# Patient Record
Sex: FEMALE | Race: BLACK OR AFRICAN AMERICAN | NOT HISPANIC OR LATINO | Employment: PART TIME | URBAN - METROPOLITAN AREA
[De-identification: names, ages, dates, MRNs, and addresses within clinical notes are randomized per-mention and may not be internally consistent; named-entity substitution may affect disease eponyms.]

---

## 2017-03-08 LAB
HBV SURFACE AG SERPL QL IA: NEGATIVE
HIV 1+2 AB+HIV1 P24 AG SERPL QL IA: NEGATIVE
RUBELLA ANTIBODY IGG QUANTITATIVE: NORMAL IU/ML
T PALLIDUM IGG SER QL: NONREACTIVE

## 2017-09-28 LAB — GROUP B STREP PCR: NEGATIVE

## 2017-10-23 ENCOUNTER — HOSPITAL ENCOUNTER (INPATIENT)
Facility: CLINIC | Age: 37
LOS: 3 days | Discharge: HOME HEALTH | End: 2017-10-26
Attending: OBSTETRICS & GYNECOLOGY | Admitting: OBSTETRICS & GYNECOLOGY
Payer: COMMERCIAL

## 2017-10-23 PROBLEM — O24.414 INSULIN CONTROLLED GESTATIONAL DIABETES MELLITUS (GDM) IN THIRD TRIMESTER: Status: ACTIVE | Noted: 2017-10-23

## 2017-10-23 LAB
ABO + RH BLD: NORMAL
ABO + RH BLD: NORMAL
BASOPHILS # BLD AUTO: 0 10E9/L (ref 0–0.2)
BASOPHILS NFR BLD AUTO: 0.1 %
BLD GP AB SCN SERPL QL: NORMAL
BLOOD BANK CMNT PATIENT-IMP: NORMAL
DIFFERENTIAL METHOD BLD: ABNORMAL
EOSINOPHIL # BLD AUTO: 0.1 10E9/L (ref 0–0.7)
EOSINOPHIL NFR BLD AUTO: 0.7 %
ERYTHROCYTE [DISTWIDTH] IN BLOOD BY AUTOMATED COUNT: 13.2 % (ref 10–15)
GLUCOSE BLDC GLUCOMTR-MCNC: 129 MG/DL (ref 70–99)
GLUCOSE SERPL-MCNC: 96 MG/DL (ref 70–99)
HCT VFR BLD AUTO: 36.5 % (ref 35–47)
HGB BLD-MCNC: 12.9 G/DL (ref 11.7–15.7)
IMM GRANULOCYTES # BLD: 0 10E9/L (ref 0–0.4)
IMM GRANULOCYTES NFR BLD: 0.4 %
LYMPHOCYTES # BLD AUTO: 1.8 10E9/L (ref 0.8–5.3)
LYMPHOCYTES NFR BLD AUTO: 21.3 %
MCH RBC QN AUTO: 29.6 PG (ref 26.5–33)
MCHC RBC AUTO-ENTMCNC: 35.3 G/DL (ref 31.5–36.5)
MCV RBC AUTO: 84 FL (ref 78–100)
MONOCYTES # BLD AUTO: 0.5 10E9/L (ref 0–1.3)
MONOCYTES NFR BLD AUTO: 5.7 %
NEUTROPHILS # BLD AUTO: 5.9 10E9/L (ref 1.6–8.3)
NEUTROPHILS NFR BLD AUTO: 71.8 %
NRBC # BLD AUTO: 0 10*3/UL
NRBC BLD AUTO-RTO: 0 /100
PLATELET # BLD AUTO: 138 10E9/L (ref 150–450)
RBC # BLD AUTO: 4.36 10E12/L (ref 3.8–5.2)
SPECIMEN EXP DATE BLD: NORMAL
WBC # BLD AUTO: 8.3 10E9/L (ref 4–11)

## 2017-10-23 PROCEDURE — 25000131 ZZH RX MED GY IP 250 OP 636 PS 637: Performed by: OBSTETRICS & GYNECOLOGY

## 2017-10-23 PROCEDURE — 00000146 ZZHCL STATISTIC GLUCOSE BY METER IP

## 2017-10-23 PROCEDURE — 85025 COMPLETE CBC W/AUTO DIFF WBC: CPT | Performed by: OBSTETRICS & GYNECOLOGY

## 2017-10-23 PROCEDURE — 86780 TREPONEMA PALLIDUM: CPT | Performed by: OBSTETRICS & GYNECOLOGY

## 2017-10-23 PROCEDURE — 86901 BLOOD TYPING SEROLOGIC RH(D): CPT | Performed by: OBSTETRICS & GYNECOLOGY

## 2017-10-23 PROCEDURE — 12000029 ZZH R&B OB INTERMEDIATE

## 2017-10-23 PROCEDURE — 82947 ASSAY GLUCOSE BLOOD QUANT: CPT | Performed by: OBSTETRICS & GYNECOLOGY

## 2017-10-23 PROCEDURE — 86900 BLOOD TYPING SEROLOGIC ABO: CPT | Performed by: OBSTETRICS & GYNECOLOGY

## 2017-10-23 PROCEDURE — 25000132 ZZH RX MED GY IP 250 OP 250 PS 637: Performed by: OBSTETRICS & GYNECOLOGY

## 2017-10-23 PROCEDURE — 86850 RBC ANTIBODY SCREEN: CPT | Performed by: OBSTETRICS & GYNECOLOGY

## 2017-10-23 PROCEDURE — 36415 COLL VENOUS BLD VENIPUNCTURE: CPT | Performed by: OBSTETRICS & GYNECOLOGY

## 2017-10-23 RX ORDER — DEXTROSE MONOHYDRATE 25 G/50ML
25-50 INJECTION, SOLUTION INTRAVENOUS
Status: DISCONTINUED | OUTPATIENT
Start: 2017-10-23 | End: 2017-10-24

## 2017-10-23 RX ORDER — NICOTINE POLACRILEX 4 MG
15-30 LOZENGE BUCCAL
Status: DISCONTINUED | OUTPATIENT
Start: 2017-10-23 | End: 2017-10-24

## 2017-10-23 RX ORDER — ZOLPIDEM TARTRATE 5 MG/1
5 TABLET ORAL
Status: DISCONTINUED | OUTPATIENT
Start: 2017-10-23 | End: 2017-10-26 | Stop reason: HOSPADM

## 2017-10-23 RX ORDER — TERBUTALINE SULFATE 1 MG/ML
0.25 INJECTION, SOLUTION SUBCUTANEOUS
Status: DISCONTINUED | OUTPATIENT
Start: 2017-10-23 | End: 2017-10-24

## 2017-10-23 RX ADMIN — DINOPROSTONE 10 MG: 10 INSERT VAGINAL at 20:46

## 2017-10-23 RX ADMIN — INSULIN HUMAN 9 UNITS: 100 INJECTION, SUSPENSION SUBCUTANEOUS at 22:00

## 2017-10-23 RX ADMIN — ZOLPIDEM TARTRATE 5 MG: 5 TABLET, FILM COATED ORAL at 23:22

## 2017-10-23 NOTE — IP AVS SNAPSHOT
MRN:[id number]                      After Visit Summary   10/23/2017    Jo Ann Mckeon    MRN: [id number]           Thank you!     Thank you for choosing [facility] for your care. Our goal is always to provide you with excellent care. Hearing back from our patients is one way we can continue to improve our services. Please take a few minutes to complete the written survey that you may receive in the mail after you visit with us. Thank you!        Patient Information     Date Of Birth          1980        About your hospital stay     You were admitted on:  October 23, 2017 You last received care in the:  [address]    You were discharged on:  October 26, 2017        Reason for your hospital stay       Maternity care                  Who to Call     For medical emergencies, please call 911.  For non-urgent questions about your medical care, please call your primary care provider or clinic, [phone]          Attending Provider     Provider Specialty    Tequila Mendoza MD OB/Gyn       Primary Care Provider Office Phone # Fax #    [facility] [phone]      After Care Instructions     Activity       Review discharge instructions            Diet       Resume previous diet            Discharge Instructions - Gestational diabetic patients       Gestational diabetic patients to follow up for fasting blood sugar and 2 hour 75gm glucose load at 6 weeks postpartum.                  Further instructions from your care team       Postpartum Vaginal Delivery Instructions    Activity       Ask family and friends for help when you need it.    Do not place anything in your vagina for 6 weeks.    You are not restricted on other activities, but take it easy for a few weeks to allow your body to recover from delivery.  You are able to do any activities you feel up to that point.    No driving until you have stopped taking your pain medications (usually  two weeks after delivery).     Call your health care provider if you have any of these symptoms:       Increased pain, swelling, redness, or fluid around your stiches from an episiotomy or perineal tear.    A fever above 100.4 F (38 C) with or without chills when placing a thermometer under your tongue.    You soak a sanitary pad with blood within 1 hour, or you see blood clots larger than a golf ball.    Bleeding that lasts more than 6 weeks.    Vaginal discharge that smells bad.    Severe pain, cramping or tenderness in your lower belly area.    A need to urinate more frequently (use the toilet more often), more urgently (use the toilet very quickly), or it burns when you urinate.    Nausea and vomiting.    Redness, swelling or pain around a vein in your leg.    Problems breastfeeding or a red or painful area on your breast.    Chest pain and cough or are gasping for air.    Problems coping with sadness, anxiety, or depression.  If you have any concerns about hurting yourself or the baby, call your provider immediately.     You have questions or concerns after you return home.     Keep your hands clean:  Always wash your hands before touching your perineal area and stitches.  This helps reduce your risk of infection.  If your hands aren't dirty, you may use an alcohol hand-rub to clean your hands. Keep your nails clean and short.    You will need to make a postpartum appointment in 6 weeks at [facility].     Pending Results     No orders found from 10/21/2017 to 10/24/2017.            Statement of Approval     Ordered          10/26/17 0804  I have reviewed and agree with all the recommendations and orders detailed in this document.  EFFECTIVE NOW     Approved and electronically signed by:  Sania Valladares MD             Admission Information     Date & Time Provider Department Dept. Phone    10/23/2017 Tequila Mendoza MD [address] [phone]      Your Vitals Were     Blood  "Pressure Pulse Temperature Respirations Height Weight    103/62 60 97.4  F (36.3  C) (Oral) 16 1.676 m (5' 6\") 84.8 kg (187 lb)    Pulse Oximetry BMI (Body Mass Index)                100% 30.18 kg/m2          ECO Information     ECO lets you send messages to your doctor, view your test results, renew your prescriptions, schedule appointments and more. To sign up, go to [email or web address] . Click on \"Log in\" on the left side of the screen, which will take you to the Welcome page. Then click on \"Sign up Now\" on the right side of the page.     You will be asked to enter the access code listed below, as well as some personal information. Please follow the directions to create your username and password.     Your access code is: [email or web address]  Expires: 2018 10:35 AM     Your access code will  in 90 days. If you need help or a new code, please call your [facility] clinic or [phone].        Care EveryWhere ID     This is your Care EveryWhere ID. This could be used by other organizations to access your [facility] medical records  [id number]        Equal Access to Services     JAXON GUILLEN AH: Cristopher Rothman, wayanira mahoney, qaybta kaalmada adedarylyada, jorge a lock. So [facility] [phone].    ATENCIÓN: Si habla español, tiene a vines disposición servicios gratuitos de asistencia lingüística. Llame al [phone].    We comply with applicable federal civil rights laws and [place] laws. We do not discriminate on the basis of race, color, national origin, age, disability, sex, sexual orientation, or gender identity.               Review of your medicines      START taking        Dose / Directions    acetaminophen 325 MG tablet   Commonly known as:  TYLENOL        Dose:  650 mg   Take 2 tablets (650 mg) by mouth every 4 hours as needed for mild pain   Quantity:  100 tablet   Refills:  0       ibuprofen 400 MG tablet   Commonly known as:  ADVIL/MOTRIN        Dose:  " 400-800 mg   Take 1-2 tablets (400-800 mg) by mouth every 6 hours as needed for other (cramping)   Quantity:  120 tablet   Refills:  0         CONTINUE these medicines which have NOT CHANGED        Dose / Directions    FISH OIL PO        Dose:  1 tablet   Take 1 tablet by mouth daily.   Refills:  0       PRENATAL VITAMINS PO        Dose:  1 tablet   Take 1 tablet by mouth daily.   Refills:  0       VITAMIN D (CHOLECALCIFEROL) PO        Dose:  2000 Units   Take 2,000 Units by mouth daily   Refills:  0         STOP taking     HumuLIN N KWIKPEN 100 UNIT/ML injection   Generic drug:  insulin isophane human                Where to get your medicines      These medications were sent to [facility] [place], [place] - [address]  [address], [place] [address]     Phone:  [phone]     acetaminophen 325 MG tablet    ibuprofen 400 MG tablet                Protect others around you: Learn how to safely use, store and throw away your medicines at www.disposemymeds.org.             Medication List: This is a list of all your medications and when to take them. Check marks below indicate your daily home schedule. Keep this list as a reference.      Medications           Morning Afternoon Evening Bedtime As Needed    acetaminophen 325 MG tablet   Commonly known as:  TYLENOL   Take 2 tablets (650 mg) by mouth every 4 hours as needed for mild pain   Last time this was given:  650 mg on 10/26/2017  5:50 AM                                FISH OIL PO   Take 1 tablet by mouth daily.                                ibuprofen 400 MG tablet   Commonly known as:  ADVIL/MOTRIN   Take 1-2 tablets (400-800 mg) by mouth every 6 hours as needed for other (cramping)   Last time this was given:  800 mg on 10/26/2017  5:50 AM                                PRENATAL VITAMINS PO   Take 1 tablet by mouth daily.                                VITAMIN D (CHOLECALCIFEROL) PO   Take 2,000 Units by mouth daily                                           More Information        Breastfeeding: Caring for Yourself  When you have a new little person in your life, it s easy to forget about yourself. There are new demands on your time. There are also new responsibilities. But it s important to take care of yourself. This will help you take better care of your new baby.     Healthy habits  Here are some healthy tips:    Get exercise when you can. If you leak milk, it will help to nurse right before the activity.    Avoid smoking. Smoking is unhealthy for you and may cause you to make less milk. Secondhand smoke is also harmful to your baby.    Talk to your healthcare provider about alcohol, if you choose to drink.    When you re sick, tell your healthcare provider that you are breastfeeding. Few medicines and illnesses affect breastfeeding, but it is important to check.    Ask your healthcare provider before taking any prescription or over-the-counter medicines, herbs, or supplements.  Comfy clothes  Suggestions for being comfortable when breastfeeding include:    Find a comfortable nursing bra. Many women find underwire uncomfortable. Some stores offer on-site fittings. Ask your healthcare provider or nurse for a referral.    If you have leaking milk, place breast pads inside your bra.    Choose an extra-supportive bra for exercise. Or you can wear two bras at the same time for more support.    Wear loose tops that can be lifted for breastfeeding. You can also buy clothes specially made for breastfeeding moms.  A note about sex  After delivery, it may take a while before your interest in sex returns. Share your feelings with your partner. Your healthcare provider will let you know when it is safe to resume having sex. When you re ready, know that:    There are several forms of birth control that can be used while breastfeeding. Ask your healthcare provider what to use for pregnancy prevention while you are nursing.    Breastfeeding  hormones may cause vaginal dryness. Some women find using a water-based lubricant makes sex more comfortable.    Milk may let down when you are aroused. Applying pressure on the nipple, using breast pads, or a towel may help with this.  When to call your healthcare provider  Call your healthcare provider if:    You feel overwhelmed and don't know where to turn.    You feel very sad or don t want to be with your baby.    You feel like your baby cries all the time and won't be soothed    You are unable to exercise, or have sex, without discomfort.    You are unsure about a medicine, illness, or activity and its effect on breastfeeding.   Date Last Reviewed: 9/7/2015 2000-2017 [employer]. [address] [place] [address], [place] [address]. All rights reserved. This information is not intended as a substitute for professional medical care. Always follow your healthcare professional's instructions.                After Delivery: When to Call the Healthcare Provider  Health problems sometimes arise with you or your baby following delivery. Call your baby's healthcare provider or your healthcare provider if you see any of the signs below.      Watch your baby for these signs  Call your baby s healthcare provider if your baby:    Has a fever (see Fever and children, below)    Has fewer than 6 wet diapers a day (Hint: Disposable diapers may feel heavy or hard after being soaked.)    Skin or whites of the eyes appear yellow    Cries for a long time, or if it sounds as if the cries are caused by pain    Has diarrhea    Refuses 2 feedings in a row    Is inactive or listless    Is vomiting    Has blood in the stool or vomit    Has a rash    Has ear drainage    Has trouble breathing    Has a seizure    Will not wake up  Trust your instincts. If you are concerned about your baby, call your baby's healthcare provider.  Fever and children  Always use a digital thermometer to check your child s temperature. Never use a  mercury thermometer.  For infants and toddlers, be sure to use a rectal thermometer correctly. A rectal thermometer may accidentally poke a hole in (perforate) the rectum. It may also pass on germs from the stool. Always follow the product maker s directions for proper use. If you don t feel comfortable taking a rectal temperature, use another method. When you talk to your child s healthcare provider, tell him or her which method you used to take your child s temperature.  Here are guidelines for fever temperature. Ear temperatures aren t accurate before 6 months of age. Don t take an oral temperature until your child is at least 4 years old.  Infant under 3 months old:  Ask your child s healthcare provider how you should take the temperature.    Rectal or forehead (temporal artery) temperature of 100.4 F (38 C) or higher, or as directed by the provider   Watch your own health for these signs  Call your own healthcare provider if you have:    Burning or pain in your breasts    Red streaks or hard lumpy areas in your breasts    Problems with breastfeeding    A fever of 100.4 F (38 C) or higher, or as directed by your healthcare provider    Extreme tiredness or body aches, as if you have the flu    Feelings of extreme sadness or anxiety, or a feeling that you don t want to be with your baby    Abdominal pain that isn t relieved with medicine    Vaginal discharge that has a bad odor    Vaginal bleeding that soaks more than one pad per hour    If you had a  section, call for concerns about your incision site such as pain, drainage, or bleeding from your incision  Date Last Reviewed: 2016-2017 [employer]. [address] [place] [address], [place] [address]. All rights reserved. This information is not intended as a substitute for professional medical care. Always follow your healthcare professional's instructions.

## 2017-10-23 NOTE — IP AVS SNAPSHOT
[address]    [place] [address]    Phone:  [phone]                                       After Visit Summary   10/23/2017    Jo Ann Mckeon    MRN: [id number]           After Visit Summary Signature Page     I have received my discharge instructions, and my questions have been answered. I have discussed any challenges I see with this plan with the nurse or doctor.    ..........................................................................................................................................  Patient/Patient Representative Signature      ..........................................................................................................................................  Patient Representative Print Name and Relationship to Patient    ..................................................               ................................................  Date                                            Time    ..........................................................................................................................................  Reviewed by Signature/Title    ...................................................              ..............................................  Date                                                            Time

## 2017-10-24 ENCOUNTER — ANESTHESIA EVENT (OUTPATIENT)
Dept: OBGYN | Facility: CLINIC | Age: 37
End: 2017-10-24
Payer: COMMERCIAL

## 2017-10-24 ENCOUNTER — ANESTHESIA (OUTPATIENT)
Dept: OBGYN | Facility: CLINIC | Age: 37
End: 2017-10-24
Payer: COMMERCIAL

## 2017-10-24 PROBLEM — O24.419 GDM, CLASS A2: Status: ACTIVE | Noted: 2017-10-24

## 2017-10-24 LAB
GLUCOSE BLDC GLUCOMTR-MCNC: 107 MG/DL (ref 70–99)
GLUCOSE BLDC GLUCOMTR-MCNC: 136 MG/DL (ref 70–99)
GLUCOSE BLDC GLUCOMTR-MCNC: 171 MG/DL (ref 70–99)
GLUCOSE BLDC GLUCOMTR-MCNC: 66 MG/DL (ref 70–99)
GLUCOSE BLDC GLUCOMTR-MCNC: 75 MG/DL (ref 70–99)
GLUCOSE BLDC GLUCOMTR-MCNC: 86 MG/DL (ref 70–99)
GLUCOSE BLDC GLUCOMTR-MCNC: 93 MG/DL (ref 70–99)
T PALLIDUM IGG+IGM SER QL: NEGATIVE

## 2017-10-24 PROCEDURE — 37000011 ZZH ANESTHESIA WARD SERVICE

## 2017-10-24 PROCEDURE — 12000037 ZZH R&B POSTPARTUM INTERMEDIATE

## 2017-10-24 PROCEDURE — 25000128 H RX IP 250 OP 636: Performed by: ANESTHESIOLOGY

## 2017-10-24 PROCEDURE — 3E0R3BZ INTRODUCTION OF ANESTHETIC AGENT INTO SPINAL CANAL, PERCUTANEOUS APPROACH: ICD-10-PCS | Performed by: OBSTETRICS & GYNECOLOGY

## 2017-10-24 PROCEDURE — 25000128 H RX IP 250 OP 636: Performed by: OBSTETRICS & GYNECOLOGY

## 2017-10-24 PROCEDURE — 25000125 ZZHC RX 250: Performed by: ANESTHESIOLOGY

## 2017-10-24 PROCEDURE — 00HU33Z INSERTION OF INFUSION DEVICE INTO SPINAL CANAL, PERCUTANEOUS APPROACH: ICD-10-PCS | Performed by: OBSTETRICS & GYNECOLOGY

## 2017-10-24 PROCEDURE — 0KQM0ZZ REPAIR PERINEUM MUSCLE, OPEN APPROACH: ICD-10-PCS | Performed by: OBSTETRICS & GYNECOLOGY

## 2017-10-24 PROCEDURE — 25000132 ZZH RX MED GY IP 250 OP 250 PS 637: Performed by: OBSTETRICS & GYNECOLOGY

## 2017-10-24 PROCEDURE — 10907ZC DRAINAGE OF AMNIOTIC FLUID, THERAPEUTIC FROM PRODUCTS OF CONCEPTION, VIA NATURAL OR ARTIFICIAL OPENING: ICD-10-PCS | Performed by: OBSTETRICS & GYNECOLOGY

## 2017-10-24 PROCEDURE — 72200001 ZZH LABOR CARE VAGINAL DELIVERY SINGLE

## 2017-10-24 PROCEDURE — 10H07YZ INSERTION OF OTHER DEVICE INTO PRODUCTS OF CONCEPTION, VIA NATURAL OR ARTIFICIAL OPENING: ICD-10-PCS | Performed by: OBSTETRICS & GYNECOLOGY

## 2017-10-24 PROCEDURE — 25000125 ZZHC RX 250: Performed by: OBSTETRICS & GYNECOLOGY

## 2017-10-24 PROCEDURE — 00000146 ZZHCL STATISTIC GLUCOSE BY METER IP

## 2017-10-24 RX ORDER — HYDROCORTISONE 2.5 %
CREAM (GRAM) TOPICAL 3 TIMES DAILY PRN
Status: DISCONTINUED | OUTPATIENT
Start: 2017-10-24 | End: 2017-10-26 | Stop reason: HOSPADM

## 2017-10-24 RX ORDER — DEXTROSE, SODIUM CHLORIDE, SODIUM LACTATE, POTASSIUM CHLORIDE, AND CALCIUM CHLORIDE 5; .6; .31; .03; .02 G/100ML; G/100ML; G/100ML; G/100ML; G/100ML
INJECTION, SOLUTION INTRAVENOUS CONTINUOUS
Status: DISCONTINUED | OUTPATIENT
Start: 2017-10-24 | End: 2017-10-24

## 2017-10-24 RX ORDER — ROPIVACAINE HYDROCHLORIDE 2 MG/ML
INJECTION, SOLUTION EPIDURAL; INFILTRATION; PERINEURAL PRN
Status: DISCONTINUED | OUTPATIENT
Start: 2017-10-24 | End: 2017-10-25 | Stop reason: HOSPADM

## 2017-10-24 RX ORDER — OXYTOCIN/0.9 % SODIUM CHLORIDE 30/500 ML
1-24 PLASTIC BAG, INJECTION (ML) INTRAVENOUS CONTINUOUS
Status: DISCONTINUED | OUTPATIENT
Start: 2017-10-24 | End: 2017-10-24

## 2017-10-24 RX ORDER — DEXTROSE MONOHYDRATE 25 G/50ML
25-50 INJECTION, SOLUTION INTRAVENOUS
Status: DISCONTINUED | OUTPATIENT
Start: 2017-10-24 | End: 2017-10-26 | Stop reason: HOSPADM

## 2017-10-24 RX ORDER — METHYLERGONOVINE MALEATE 0.2 MG/ML
200 INJECTION INTRAVENOUS
Status: DISCONTINUED | OUTPATIENT
Start: 2017-10-24 | End: 2017-10-24

## 2017-10-24 RX ORDER — AMOXICILLIN 250 MG
1-2 CAPSULE ORAL 2 TIMES DAILY
Status: DISCONTINUED | OUTPATIENT
Start: 2017-10-24 | End: 2017-10-26 | Stop reason: HOSPADM

## 2017-10-24 RX ORDER — OXYTOCIN 10 [USP'U]/ML
10 INJECTION, SOLUTION INTRAMUSCULAR; INTRAVENOUS
Status: DISCONTINUED | OUTPATIENT
Start: 2017-10-24 | End: 2017-10-24

## 2017-10-24 RX ORDER — IBUPROFEN 400 MG/1
800 TABLET, FILM COATED ORAL
Status: DISCONTINUED | OUTPATIENT
Start: 2017-10-24 | End: 2017-10-24

## 2017-10-24 RX ORDER — NALOXONE HYDROCHLORIDE 0.4 MG/ML
.1-.4 INJECTION, SOLUTION INTRAMUSCULAR; INTRAVENOUS; SUBCUTANEOUS
Status: DISCONTINUED | OUTPATIENT
Start: 2017-10-24 | End: 2017-10-24

## 2017-10-24 RX ORDER — SODIUM CHLORIDE 9 MG/ML
INJECTION, SOLUTION INTRAVENOUS CONTINUOUS
Status: DISCONTINUED | OUTPATIENT
Start: 2017-10-24 | End: 2017-10-24

## 2017-10-24 RX ORDER — OXYCODONE AND ACETAMINOPHEN 5; 325 MG/1; MG/1
1 TABLET ORAL
Status: DISCONTINUED | OUTPATIENT
Start: 2017-10-24 | End: 2017-10-24

## 2017-10-24 RX ORDER — NALBUPHINE HYDROCHLORIDE 10 MG/ML
2.5-5 INJECTION, SOLUTION INTRAMUSCULAR; INTRAVENOUS; SUBCUTANEOUS EVERY 6 HOURS PRN
Status: DISCONTINUED | OUTPATIENT
Start: 2017-10-24 | End: 2017-10-24

## 2017-10-24 RX ORDER — ONDANSETRON 4 MG/1
4 TABLET, ORALLY DISINTEGRATING ORAL EVERY 6 HOURS PRN
Status: DISCONTINUED | OUTPATIENT
Start: 2017-10-24 | End: 2017-10-26 | Stop reason: HOSPADM

## 2017-10-24 RX ORDER — OXYTOCIN/0.9 % SODIUM CHLORIDE 30/500 ML
100 PLASTIC BAG, INJECTION (ML) INTRAVENOUS CONTINUOUS
Status: DISCONTINUED | OUTPATIENT
Start: 2017-10-24 | End: 2017-10-26 | Stop reason: HOSPADM

## 2017-10-24 RX ORDER — DEXTROSE MONOHYDRATE 25 G/50ML
25-50 INJECTION, SOLUTION INTRAVENOUS
Status: DISCONTINUED | OUTPATIENT
Start: 2017-10-24 | End: 2017-10-24

## 2017-10-24 RX ORDER — NICOTINE POLACRILEX 4 MG
15-30 LOZENGE BUCCAL
Status: DISCONTINUED | OUTPATIENT
Start: 2017-10-24 | End: 2017-10-26 | Stop reason: HOSPADM

## 2017-10-24 RX ORDER — ONDANSETRON 2 MG/ML
4 INJECTION INTRAMUSCULAR; INTRAVENOUS EVERY 6 HOURS PRN
Status: DISCONTINUED | OUTPATIENT
Start: 2017-10-24 | End: 2017-10-24

## 2017-10-24 RX ORDER — ACETAMINOPHEN 325 MG/1
650 TABLET ORAL EVERY 4 HOURS PRN
Status: DISCONTINUED | OUTPATIENT
Start: 2017-10-24 | End: 2017-10-24

## 2017-10-24 RX ORDER — LANOLIN 100 %
OINTMENT (GRAM) TOPICAL
Status: DISCONTINUED | OUTPATIENT
Start: 2017-10-24 | End: 2017-10-26 | Stop reason: HOSPADM

## 2017-10-24 RX ORDER — EPHEDRINE SULFATE 50 MG/ML
5 INJECTION, SOLUTION INTRAMUSCULAR; INTRAVENOUS; SUBCUTANEOUS
Status: DISCONTINUED | OUTPATIENT
Start: 2017-10-24 | End: 2017-10-24

## 2017-10-24 RX ORDER — SODIUM CHLORIDE, SODIUM LACTATE, POTASSIUM CHLORIDE, CALCIUM CHLORIDE 600; 310; 30; 20 MG/100ML; MG/100ML; MG/100ML; MG/100ML
INJECTION, SOLUTION INTRAVENOUS CONTINUOUS
Status: DISCONTINUED | OUTPATIENT
Start: 2017-10-24 | End: 2017-10-24

## 2017-10-24 RX ORDER — ROPIVACAINE HYDROCHLORIDE 2 MG/ML
10 INJECTION, SOLUTION EPIDURAL; INFILTRATION; PERINEURAL ONCE
Status: DISCONTINUED | OUTPATIENT
Start: 2017-10-24 | End: 2017-10-24

## 2017-10-24 RX ORDER — NICOTINE POLACRILEX 4 MG
15-30 LOZENGE BUCCAL
Status: DISCONTINUED | OUTPATIENT
Start: 2017-10-24 | End: 2017-10-24

## 2017-10-24 RX ORDER — FENTANYL CITRATE 50 UG/ML
100 INJECTION, SOLUTION INTRAMUSCULAR; INTRAVENOUS ONCE
Status: DISCONTINUED | OUTPATIENT
Start: 2017-10-24 | End: 2017-10-24

## 2017-10-24 RX ORDER — BISACODYL 10 MG
10 SUPPOSITORY, RECTAL RECTAL DAILY PRN
Status: DISCONTINUED | OUTPATIENT
Start: 2017-10-26 | End: 2017-10-26 | Stop reason: HOSPADM

## 2017-10-24 RX ORDER — OXYTOCIN 10 [USP'U]/ML
10 INJECTION, SOLUTION INTRAMUSCULAR; INTRAVENOUS
Status: DISCONTINUED | OUTPATIENT
Start: 2017-10-24 | End: 2017-10-26 | Stop reason: HOSPADM

## 2017-10-24 RX ORDER — LIDOCAINE HYDROCHLORIDE AND EPINEPHRINE 15; 5 MG/ML; UG/ML
INJECTION, SOLUTION EPIDURAL PRN
Status: DISCONTINUED | OUTPATIENT
Start: 2017-10-24 | End: 2017-10-25 | Stop reason: HOSPADM

## 2017-10-24 RX ORDER — NALOXONE HYDROCHLORIDE 0.4 MG/ML
.1-.4 INJECTION, SOLUTION INTRAMUSCULAR; INTRAVENOUS; SUBCUTANEOUS
Status: DISCONTINUED | OUTPATIENT
Start: 2017-10-24 | End: 2017-10-26 | Stop reason: HOSPADM

## 2017-10-24 RX ORDER — PRENATAL VIT/IRON FUM/FOLIC AC 27MG-0.8MG
1 TABLET ORAL DAILY
Status: DISCONTINUED | OUTPATIENT
Start: 2017-10-25 | End: 2017-10-26 | Stop reason: HOSPADM

## 2017-10-24 RX ORDER — CARBOPROST TROMETHAMINE 250 UG/ML
250 INJECTION, SOLUTION INTRAMUSCULAR
Status: DISCONTINUED | OUTPATIENT
Start: 2017-10-24 | End: 2017-10-24

## 2017-10-24 RX ORDER — ACETAMINOPHEN 325 MG/1
650 TABLET ORAL EVERY 4 HOURS PRN
Status: DISCONTINUED | OUTPATIENT
Start: 2017-10-24 | End: 2017-10-26 | Stop reason: HOSPADM

## 2017-10-24 RX ORDER — FENTANYL CITRATE 50 UG/ML
INJECTION, SOLUTION INTRAMUSCULAR; INTRAVENOUS PRN
Status: DISCONTINUED | OUTPATIENT
Start: 2017-10-24 | End: 2017-10-25 | Stop reason: HOSPADM

## 2017-10-24 RX ORDER — IBUPROFEN 400 MG/1
400-800 TABLET, FILM COATED ORAL EVERY 6 HOURS PRN
Status: DISCONTINUED | OUTPATIENT
Start: 2017-10-24 | End: 2017-10-26 | Stop reason: HOSPADM

## 2017-10-24 RX ORDER — OXYTOCIN/0.9 % SODIUM CHLORIDE 30/500 ML
340 PLASTIC BAG, INJECTION (ML) INTRAVENOUS CONTINUOUS PRN
Status: DISCONTINUED | OUTPATIENT
Start: 2017-10-24 | End: 2017-10-26 | Stop reason: HOSPADM

## 2017-10-24 RX ORDER — LIDOCAINE 40 MG/G
CREAM TOPICAL
Status: DISCONTINUED | OUTPATIENT
Start: 2017-10-24 | End: 2017-10-24

## 2017-10-24 RX ORDER — OXYTOCIN/0.9 % SODIUM CHLORIDE 30/500 ML
100-340 PLASTIC BAG, INJECTION (ML) INTRAVENOUS CONTINUOUS PRN
Status: COMPLETED | OUTPATIENT
Start: 2017-10-24 | End: 2017-10-24

## 2017-10-24 RX ADMIN — ACETAMINOPHEN 650 MG: 325 TABLET, FILM COATED ORAL at 22:20

## 2017-10-24 RX ADMIN — MISOPROSTOL 800 MCG: 200 TABLET ORAL at 15:26

## 2017-10-24 RX ADMIN — SODIUM CHLORIDE, POTASSIUM CHLORIDE, SODIUM LACTATE AND CALCIUM CHLORIDE 1000 ML: 600; 310; 30; 20 INJECTION, SOLUTION INTRAVENOUS at 12:32

## 2017-10-24 RX ADMIN — IBUPROFEN 800 MG: 400 TABLET ORAL at 22:20

## 2017-10-24 RX ADMIN — LIDOCAINE HYDROCHLORIDE AND EPINEPHRINE 3 ML: 15; 5 INJECTION, SOLUTION EPIDURAL at 12:56

## 2017-10-24 RX ADMIN — ROPIVACAINE HYDROCHLORIDE 4 ML: 2 INJECTION, SOLUTION EPIDURAL; INFILTRATION at 12:58

## 2017-10-24 RX ADMIN — OXYTOCIN-SODIUM CHLORIDE 0.9% IV SOLN 30 UNIT/500ML 2 MILLI-UNITS/MIN: 30-0.9/5 SOLUTION at 08:39

## 2017-10-24 RX ADMIN — ROPIVACAINE HYDROCHLORIDE 4 ML: 2 INJECTION, SOLUTION EPIDURAL; INFILTRATION at 13:02

## 2017-10-24 RX ADMIN — OXYTOCIN-SODIUM CHLORIDE 0.9% IV SOLN 30 UNIT/500ML 340 ML/HR: 30-0.9/5 SOLUTION at 15:03

## 2017-10-24 RX ADMIN — LIDOCAINE HYDROCHLORIDE 20 ML: 10 INJECTION, SOLUTION INFILTRATION; PERINEURAL at 15:07

## 2017-10-24 RX ADMIN — SENNOSIDES AND DOCUSATE SODIUM 1 TABLET: 8.6; 5 TABLET ORAL at 19:29

## 2017-10-24 RX ADMIN — FENTANYL CITRATE 100 MCG: 50 INJECTION, SOLUTION INTRAMUSCULAR; INTRAVENOUS at 12:58

## 2017-10-24 RX ADMIN — IBUPROFEN 800 MG: 400 TABLET ORAL at 17:08

## 2017-10-24 RX ADMIN — SODIUM CHLORIDE, POTASSIUM CHLORIDE, SODIUM LACTATE AND CALCIUM CHLORIDE: 600; 310; 30; 20 INJECTION, SOLUTION INTRAVENOUS at 08:39

## 2017-10-24 RX ADMIN — SODIUM CHLORIDE, SODIUM LACTATE, POTASSIUM CHLORIDE, CALCIUM CHLORIDE AND DEXTROSE MONOHYDRATE: 5; 600; 310; 30; 20 INJECTION, SOLUTION INTRAVENOUS at 13:09

## 2017-10-24 RX ADMIN — ACETAMINOPHEN 650 MG: 325 TABLET, FILM COATED ORAL at 17:08

## 2017-10-24 RX ADMIN — Medication 12 ML/HR: at 13:04

## 2017-10-24 RX ADMIN — OXYTOCIN-SODIUM CHLORIDE 0.9% IV SOLN 30 UNIT/500ML 100 ML/HR: 30-0.9/5 SOLUTION at 15:51

## 2017-10-24 NOTE — PLAN OF CARE
Patient arrived to the unit and oriented to the room. Monitors applied. Plan of care discussed with patient and significant other; including cervical ripening, PIV, pain plan, and plan for morning. Patient in agreement with plan of care. Dr. Mendoza updated and will follow patient overnight, plans to be here at 0730 am for ROM and/or pitocin augmentation.

## 2017-10-24 NOTE — PROCEDURES
VAGINAL DELIVERY NOTE    Date of Delivery:  10/24/2017    Jo Ann Mckeon is a 37 year old  who was admitted at 39w2d due to GDM on insulin well controlled.   Her prenatal course was significant for GDM on insulin; GBS negative; normal antepartum testing.   Her GBS was negative.  After admission to Labor and Delivery, the patient received cervical ripening with cervidil overnight; she was then favorable on 10/24/17 morning so AROM performed and pitocin started. Patient had regular contractions.   She received epidural for pain control at 4cm dilated; her blood sugars were monitored during labor hourly.   The patient progressed from 6cm to complete quickly and she began to have variable decels so Dr. Mendoza went to labor room and stayed there until delivery; pt was complete quickly and the pitocin had been shut off and variable decels resolved and moderate variability and reactive.    The patient began to push as she was feeling pressure and had strong urge to push so laboring down did not happen.  With progressive pushing the contractions felt minimal so pitocin restarted and an IUPC placed to assess strength of contractions and they were adequate; the patient was pushing with slow progress and the variable decels began to get deeper and moderate variability yet; the FHRT stayed in the 80-90 range and then slowly recovered to baseline of 130.   When the vertex was at 4+ station the FHRT stayed in the 80-90 range and I discussed vacuum delivery with pt and janett. Prior to employing the Kiwi vacuum device, the patient was informed about the potential risks of using the vacuum, including but not limited to the risk of fetal cephalohematoma and possible injury to the fetal scalp or skull. Possible alternative labor strategies were discussed including the option of  section. Informed verbal consent was obtained from the patient. When the decision was made to employ the vacuum, arrangements were made to  have the OR crew available for  section in case it was needed, and surgical and  resuscitation personnel were available.       The bladder was not drained as the dozier catheter had just been removed 20 minutes previously. Then the Kiwi vacuum was applied to the fetal vertex. The fetal vertex was at  4+ station in an ESDRAS position and the estimated fetal weight was 8 pounds and 7 ounces by my Leopold's.   The number of applications was one. There were no popoffs. Total vacuum application time was 20 seconds and delivery of the vertex occurred over an intact perineum.  Afterward the infant scalp was examined and there were no concerning findings.   A nuchal cord was not present and was cut immediately as baby not crying vigorously- NNP present for delivery due to use of the vacuum. The remainder of the baby was delivered easily- a viable male infant with Apgars of 8 and 9. There was no shoulder dystocia present. The baby was placed on the maternal abdomen and delayed cord clamping was done. The weight was not done immediately so as to maximize bonding and skin to skin contact.  The patient received IV pitocin immediately after delivery. Cord blood was obtained.  The placenta delivered spontaneously intact and was not sent to pathology.     There were small 2nd degree laceration of posterior perineum that was repaired with 3-0 Vicryl in the standard fashion with good hemostasis.  The vagina and perineum were inspected and no additional tears noted. There was tiny 1st degree laceration of right periurethra that did not need repair. Count were correct and the fundus was massaged with the RN and it was firm with no active clots or bleeding.   EBL=200 ml. IV pitocin was given immediately after delivery and 800 mcg of rectal cytotec placed as well prophylactically.    Mother and infant were doing well.      No gross fetal defects were observed.  The baby went to term nursery.  The mother was doing well in room.   Sponge and sharp count is correct. There were no complications.    Tequila Mendoza MD

## 2017-10-24 NOTE — PLAN OF CARE
Problem: Patient Care Overview  Goal: Plan of Care/Patient Progress Review  Outcome: Improving  Pt. admitted from L&D  via wheelchair and transferred to bed with 1 assist. Pt. arrived with baby and was accompanied by family and arrived with personal belongings. Report was taken from Cheryl OVERTON in L&D. VSS. Fundus is firm and midline.  Vaginal bleeding is small to scant.  SL in right hand.  Pt. oriented to the room and call light system. Continue to monitor, on pathway.

## 2017-10-24 NOTE — PLAN OF CARE
Assumed care of patient at 0730. Pt resting comfortably in bed at this time, c/o mild uterine cramping. Dr. Mendoza to bedside at 0750 for removal of cervidil and AROM. Pitocin initiated with pt and spouse verbal consent. Verbalize understanding of plan of care. Pitocin increased per protocol. Pt became increasingly uncomfortable, requesting epidural. Epidural provided minimal relief for patient. SVE noted pt to be changing quickly, 7/80/-2.  Dr. Mendoza called to bedside to assess fetal monitoring, repetitive variables noted despite repositioning, oxygen placement and stopping pitocin. Pt complete by Dr. Mendoza at 1410. Due to fetal heart rate with variables and pt discomfort, pt and room set up to begin pushing. See delivery record for further details. Delivery viable male at 1457. Cont to monitor and assess.

## 2017-10-24 NOTE — PLAN OF CARE
Problem: Labor (Cervical Ripen, Induct, Augment) (Adult,Obstetrics,Pediatric)  Goal: Signs and Symptoms of Listed Potential Problems Will be Absent, Minimized or Managed (Labor)  Signs and symptoms of listed potential problems will be absent, minimized or managed by discharge/transition of care (reference Labor (Cervical Ripen, Induct, Augment) (Adult,Obstetrics,Pediatric) CPG).   Outcome: No Change  Pt. Patient rested comfortably overnight. Denied leaking or bleeding. Category 1 tracing. Occasional contractions and irritability noted, patient feeling slightly crampy. Dr. Mendoza will be here this am to assess.

## 2017-10-24 NOTE — ANESTHESIA PREPROCEDURE EVALUATION
"Procedure: * No procedures listed *  Preop diagnosis: * No pre-op diagnosis entered *    No Known Allergies  Past Medical History:   Diagnosis Date     Diabetes (H)     Gestational     Past Surgical History:   Procedure Laterality Date     APPENDECTOMY       Prior to Admission medications    Medication Sig Start Date End Date Taking? Authorizing Provider   VITAMIN D, CHOLECALCIFEROL, PO Take 2,000 Units by mouth daily   Yes Reported, Patient   insulin isophane human (HUMULIN N KWIKPEN) 100 UNIT/ML injection Inject 18 Units Subcutaneous At Bedtime   Yes Reported, Patient   PRENATAL VITAMINS PO Take 1 tablet by mouth daily.   Yes Reported, Patient   Omega-3 Fatty Acids (FISH OIL PO) Take 1 tablet by mouth daily.    Reported, Patient     Current Facility-Administered Medications Ordered in Epic   Medication Dose Route Frequency Last Rate Last Dose     lactated ringers infusion   Intravenous Continuous 125 mL/hr at 10/24/17 0839       lactated ringers BOLUS 500 mL  500 mL Intravenous Once PRN         acetaminophen (TYLENOL) tablet 650 mg  650 mg Oral Q4H PRN         naloxone (NARCAN) injection 0.1-0.4 mg  0.1-0.4 mg Intravenous Q2 Min PRN         ondansetron (ZOFRAN) injection 4 mg  4 mg Intravenous Q6H PRN         oxytocin (PITOCIN) injection 10 Units  10 Units Intramuscular Once PRN         oxytocin (PITOCIN) 30 units in 500 mL 0.9% NaCl infusion  100-340 mL/hr Intravenous Continuous PRN         Medication Instructions: misoprostol (CYTOTEC)- Nurse to discuss ordering with provider, if needed. Ordered via \"OB misoprostol (CYTOTEC) Postpartum Hemorrhage PANEL\"   Does not apply Continuous PRN         methylergonovine (METHERGINE) injection 200 mcg  200 mcg Intramuscular Once PRN         carboprost (HEMABATE) injection 250 mcg  250 mcg Intramuscular Once PRN         lidocaine 1 % 0.1-20 mL  0.1-20 mL Subcutaneous Once PRN         ibuprofen (ADVIL/MOTRIN) tablet 800 mg  800 mg Oral Once PRN         oxyCODONE-acetaminophen " (PERCOCET) 5-325 MG per tablet 1 tablet  1 tablet Oral Once PRN         lidocaine 1 % 1 mL  1 mL Other Q1H PRN         lidocaine (LMX4) cream   Topical Q1H PRN         sodium chloride (PF) 0.9% PF flush 3 mL  3 mL Intracatheter Q1H PRN         sodium chloride (PF) 0.9% PF flush 3 mL  3 mL Intracatheter Q8H         oxytocin (PITOCIN) 30 units in 500 mL 0.9% NaCl infusion  1-24 amy-units/min Intravenous Continuous 10 mL/hr at 10/24/17 1100 10 amy-units/min at 10/24/17 1100     dextrose 5% in lactated ringers infusion   Intravenous Continuous         0.9% sodium chloride infusion   Intravenous Continuous         glucose 40 % gel 15-30 g  15-30 g Oral Q15 Min PRN        Or     dextrose 50 % injection 25-50 mL  25-50 mL Intravenous Q15 Min PRN        Or     glucagon injection 1 mg  1 mg Subcutaneous Q15 Min PRN         insulin 1 units/1 mL saline (NovoLIN, HumuLIN Regular) infusion ADULT/PEDS   Intravenous Continuous PRN         dinoprostone(CERVIDIL) suppository REMOVAL   Vaginal Once         terbutaline (BRETHINE) injection 0.25 mg  0.25 mg Subcutaneous Once PRN         zolpidem (AMBIEN) tablet 5 mg  5 mg Oral At Bedtime PRN   5 mg at 10/23/17 5236     No current [facility]-ordered outpatient prescriptions on file.     Wt Readings from Last 1 Encounters:   10/23/17 84.8 kg (187 lb)     Temp Readings from Last 1 Encounters:   10/24/17 36.4  C (97.5  F) (Temporal)     BP Readings from Last 6 Encounters:   10/24/17 119/59   03/15/15 116/68   02/26/13 98/60     Pulse Readings from Last 4 Encounters:   10/24/17 71   03/15/15 80   02/26/13 73     Resp Readings from Last 1 Encounters:   10/24/17 16     SpO2 Readings from Last 1 Encounters:   03/15/15 100%     Recent Labs   Lab Test  10/23/17   1950  02/26/13   0740   NA   --   136   POTASSIUM   --   4.2   CHLORIDE   --   104   CO2   --   22   ANIONGAP   --   10   GLC  96  87   BUN   --   5   CR   --   0.46*   JAUN   --   8.8     Recent Labs   Lab Test  10/23/17   1950   02/26/13   0740   WBC  8.3  11.7*   HGB  12.9  12.6   PLT  138*  193                           Anesthesia Plan      History & Physical Review      ASA Status:  .  OB Epidural Asa: 2            Postoperative Care      Consents  Anesthetic plan, risks, benefits and alternatives discussed with:  Patient..                          .

## 2017-10-24 NOTE — ANESTHESIA PROCEDURE NOTES
Peripheral nerve/Neuraxial procedure note : epidural catheter  Pre-Procedure  Performed by HENRIQUE KERNS  Location: OB      Pre-Anesthestic Checklist: patient identified, IV checked, site marked, risks and benefits discussed, informed consent, monitors and equipment checked, pre-op evaluation and at physician/surgeon's request    Timeout  Correct Patient: Yes   Correct Procedure: Yes   Correct Site: Yes   Correct Laterality: Yes   Correct Position: Yes   Site Marked: Yes   .   Procedure Documentation    .    Procedure:    Epidural catheter.  Insertion Site:L2-3  (midline approach) Injection technique: LORT saline and LORT air   Local skin infiltrated with 1 mL of 1% lidocaine.       Patient Prep;povidone-iodine 7.5% surgical scrub.  .  Needle: Touhy needle Needle Gauge: 17.    Needle Length (Inches) 3.5  # of attempts: 2 and  # of redirects:  3 .   Catheter: 19 G . .  Catheter threaded easily  .  .   .    Assessment/Narrative  Paresthesias: No.  .  .  Aspiration negative for heme or CSF  . Test dose of 3 mL lidocaine 1.5% w/ 1:200,000 epinephrine at. Test dose negative for signs of intravascular, subdural or intrathecal injection. Comments:  Pre-procedure time out completed. Patient in sitting position, the lumbar spine was prepped and draped in sterile fashion. The L2/L3 interspace was identified and local anesthetic was injected for local skin infiltration. I achieved KATHLEEN at this level but was unable to thread the catheter, furthermore, this level was suspicious but not classic for dural puncture. So I injected local anaesthetic at L3/L4 and a 17 G touhy needle was advanced to the epidural space which was confirmed with the loss of resistance technique at 5 cm. A catheter was then advanced easily into the epidural space. The cathter was left at 9 cm at the skin. Negative aspiration of blood and CSF was confirmed. A test dose of 1.5% lidocaine with 1:200,000 epinephrine was injected through the catheter and  was negative for intravascular injection. The site was covered with sterile tegaderm and the catheter was secured with tape.

## 2017-10-24 NOTE — H&P
[facility] Labor and Delivery History and Physical    Jo Ann Mckeon MRN# [id number]   Age: 37 year old YOB: 1980     Date of Admission:  10/23/2017    Primary care provider: Александр, [facility]           Chief Complaint:   Jo Ann Mckeon is a 37 year old female  who is 39w2d pregnant and being admitted for induction of labor, indication GDM on insulin; pt with well controlled sugars and is on 18 units at bedtime-managed by [facility]; pt with normal BPP/NST's weekly at [facility] (couldnt do twice weekly) and normal growth sono with last one on 17 showing 69% so EFW to be 8lb 3oz at 39 weeks; GBS negative;  Pt admitted last night for ripening and she received cervidil and now is more favorable; FBS normal this morning. BOY.          Pregnancy history:     OBSTETRIC HISTORY:    Obstetric History       T2      L0     SAB0   TAB0   Ectopic1   Multiple0   Live Births0       # Outcome Date GA Lbr Arthur/2nd Weight Sex Delivery Anes PTL Lv   4 Current            3 Term            2 Term            1 Ectopic                   EDC: Estimated Date of Delivery: 10/29/17    Prenatal Labs:   Lab Results   Component Value Date    ABO B 10/23/2017    RH Pos 10/23/2017    AS Neg 10/23/2017    HEPBANG Negative 2017    CHPCRT  2013     Negative for C. trachomatis rRNA by transcription mediated amplification.   A negative result by transcription mediated amplification does not preclude the   presence of C. trachomatis infection because results are dependent on proper   and adequate collection, absence of inhibitors, and sufficient rRNA to be   detected.    GCPCRT  2013     Negative for N. gonorrhoeae rRNA by transcription mediated amplification.   A negative result by transcription mediated amplification does not preclude the   presence of N. gonorrhoeae infection because results are dependent on proper   and adequate collection, absence of inhibitors, and  sufficient rRNA to be   detected.    TREPAB Nonreactive 2017    HGB 12.9 10/23/2017       GBS Status:   Lab Results   Component Value Date    GBS Negative 2017       Active Problem List  Patient Active Problem List   Diagnosis     Indication for care in labor or delivery     Insulin controlled gestational diabetes mellitus (GDM) in third trimester       Medication Prior to Admission  Prescriptions Prior to Admission   Medication Sig Dispense Refill Last Dose     VITAMIN D, CHOLECALCIFEROL, PO Take 2,000 Units by mouth daily   10/22/2017 at Unknown time     insulin isophane human (HUMULIN N KWIKPEN) 100 UNIT/ML injection Inject 18 Units Subcutaneous At Bedtime   10/22/2017 at Unknown time     PRENATAL VITAMINS PO Take 1 tablet by mouth daily.   10/22/2017 at Unknown time     Omega-3 Fatty Acids (FISH OIL PO) Take 1 tablet by mouth daily.   More than a month at Unknown time   .        Maternal Past Medical History:     Past Medical History:   Diagnosis Date     Diabetes (H)     Gestational                       Family History:   This patient has no significant family history            Social History:   This patient has no significant social history         Review of Systems:   The Review of Systems is negative other than noted in the HPI          Physical Exam:   Vitals were reviewed  Temp: 97.6  F (36.4  C) Temp src: Temporal BP: 98/55                Abd- soft, gravid, EFW 8lb   Cervix:   Membranes: AROM  clear amniotic fluid   Dilation: 2   Effacement: 70%   Station:-2   Consistency: soft   Position: Posterior  Presentation:Vertex  Fetal Heart Rate Tracing: reactive and reassuring  Tocometer: external monitor                       Assessment:   Jo Ann Mckeon is a 39w2d pregnant female admitted with induction of labor, indication GDM on insulin.          Plan:   Will start pitocin now for induction; accuchecks hourly when in labor and pt okay for epidural or nitrous or IV meds. Anticipate .  Tequila  CHARU Mendoza MD

## 2017-10-25 LAB — GLUCOSE BLDC GLUCOMTR-MCNC: 94 MG/DL (ref 70–99)

## 2017-10-25 PROCEDURE — 25000132 ZZH RX MED GY IP 250 OP 250 PS 637: Performed by: OBSTETRICS & GYNECOLOGY

## 2017-10-25 PROCEDURE — 12000037 ZZH R&B POSTPARTUM INTERMEDIATE

## 2017-10-25 PROCEDURE — 00000146 ZZHCL STATISTIC GLUCOSE BY METER IP

## 2017-10-25 RX ADMIN — ACETAMINOPHEN 650 MG: 325 TABLET, FILM COATED ORAL at 05:18

## 2017-10-25 RX ADMIN — SENNOSIDES AND DOCUSATE SODIUM 1 TABLET: 8.6; 5 TABLET ORAL at 20:20

## 2017-10-25 RX ADMIN — IBUPROFEN 800 MG: 400 TABLET ORAL at 17:19

## 2017-10-25 RX ADMIN — SENNOSIDES AND DOCUSATE SODIUM 1 TABLET: 8.6; 5 TABLET ORAL at 08:43

## 2017-10-25 RX ADMIN — IBUPROFEN 800 MG: 400 TABLET ORAL at 11:19

## 2017-10-25 RX ADMIN — ACETAMINOPHEN 650 MG: 325 TABLET, FILM COATED ORAL at 11:19

## 2017-10-25 RX ADMIN — PRENATAL VIT W/ FE FUMARATE-FA TAB 27-0.8 MG 1 TABLET: 27-0.8 TAB at 08:43

## 2017-10-25 RX ADMIN — IBUPROFEN 800 MG: 400 TABLET ORAL at 23:40

## 2017-10-25 RX ADMIN — IBUPROFEN 800 MG: 400 TABLET ORAL at 05:18

## 2017-10-25 RX ADMIN — ACETAMINOPHEN 650 MG: 325 TABLET, FILM COATED ORAL at 23:40

## 2017-10-25 RX ADMIN — ACETAMINOPHEN 650 MG: 325 TABLET, FILM COATED ORAL at 17:19

## 2017-10-25 NOTE — PLAN OF CARE
Problem: Patient Care Overview  Goal: Plan of Care/Patient Progress Review  Outcome: Improving  Doing well,vss,voiding with out difficulty,pain control with tylenol&ibuprofen,using aqua k pad for cramping,attempts to breast feed,pumping&bottle feeding EBM per choice.

## 2017-10-25 NOTE — ANESTHESIA POSTPROCEDURE EVALUATION
Patient: Jo Ann Mckeon    * No procedures listed *    Diagnosis:* No pre-op diagnosis entered *  Diagnosis Additional Information: No value filed.    Anesthesia Type:  No value filed.    Note:  Anesthesia Post Evaluation       Anesthetic complications: None          Last vitals:  Vitals:    10/24/17 1800 10/24/17 2220 10/25/17 0810   BP: 123/64 122/67 110/65   Pulse:      Resp: 16 16 16   Temp: 36.7  C (98  F) 36.6  C (97.9  F) 36.6  C (97.9  F)   SpO2:            Electronically Signed By: Opal Ramos  October 25, 2017  4:17 PM

## 2017-10-25 NOTE — PROGRESS NOTES
[facility] Obstetrics Post-Partum Progress Note          Assessment and Plan:    Assessment:   Post-partum day #1  Normal spontaneous vaginal delivery  L&D complications: None      Doing well.  No excessive bleeding      Plan:   Ambulation encouraged; routine PP cares; discharge home tomorrow; binder ordered and heating pad for lower back.           Interval History:   Doing well.  Pain is adequately controlled.  No fevers.  No history of foul-smelling vaginal discharge.  Good appetite.  Denies chest pain, shortness of breath, nausea or vomiting.  Vaginal bleeding is similar to a heavy menstrual flow.  Breastfeeding well.            Significant Problems:    None          Review of Systems:    The patient denies any chest pain, shortness of breath, excessive pain, fever, chills, purulent drainage from the wound, nausea or vomiting.          Medications:     Current Facility-Administered Medications   Medication     ondansetron (ZOFRAN-ODT) ODT tab 4 mg     glucose 40 % gel 15-30 g    Or     dextrose 50 % injection 25-50 mL    Or     glucagon injection 1 mg     oxytocin (PITOCIN) 30 units in 500 mL 0.9% NaCl infusion     ibuprofen (ADVIL/MOTRIN) tablet 400-800 mg     acetaminophen (TYLENOL) tablet 650 mg     naloxone (NARCAN) injection 0.1-0.4 mg     senna-docusate (SENOKOT-S;PERICOLACE) 8.6-50 MG per tablet 1-2 tablet     [START ON 10/26/2017] bisacodyl (DULCOLAX) Suppository 10 mg     [START ON 10/26/2017] sodium phosphate (FLEET ENEMA) 1 enema     hydrocortisone 2.5 % cream     lanolin ointment     lactated ringers BOLUS 1,000 mL     oxytocin (PITOCIN) 30 units in 500 mL 0.9% NaCl infusion     oxytocin (PITOCIN) injection 10 Units     NO Rho (D) immune globulin (RhoGam) needed - mother Rh POSITIVE     No MMR Needed - Assessment: Patient does not need MMR vaccine     No Tdap Needed - Assessment: Patient does not need Tdap vaccine     prenatal multivitamin plus iron per tablet 1 tablet     zolpidem (AMBIEN)  tablet 5 mg     Facility-Administered Medications Ordered in Other Encounters   Medication     fentaNYL (PF) (SUBLIMAZE) injection     ropivacaine (NAROPIN) injection     lidocaine-EPINEPHrine 1.5 %-[id number] injection             Physical Exam:   All vitals stable  Uterine fundus is firm, non-tender and at the level of the umbilicus          Data:     Hemoglobin   Date Value Ref Range Status   10/23/2017 12.9 11.7 - 15.7 g/dL Final   02/26/2013 12.6 11.7 - 15.7 g/dL Final   09/17/2007 13.9 11.7 - 15.7 g/dL Final     No imaging studies have been ordered  Tequila Mendoza MD

## 2017-10-25 NOTE — PLAN OF CARE
Problem: Patient Care Overview  Goal: Plan of Care/Patient Progress Review  Outcome: Improving  Pt on pathway. Pain controlled well with Ibuprofen and Tylenol. Voiding in good amounts. Ambulating well. Attempting to breastfeed baby and pumping.

## 2017-10-25 NOTE — PLAN OF CARE
Problem: Patient Care Overview  Goal: Plan of Care/Patient Progress Review  Outcome: Improving  Pt is breastfeeding and formula feeding.  Pt is also pumping and feeding baby pumped contents.  Fasting glucose was 94.  VSS.  Ibuprofen and tylenol for pain.  Voiding adequately.  PIV removed.  Continue to monitor.

## 2017-10-25 NOTE — LACTATION NOTE
Initial Lactation visit. Hand out given. Recommend unlimited, frequent breast feedings: At least 8 - 12 times every 24 hours. Avoid pacifiers and supplementation with formula unless medically indicated. Explained benefits of holding baby skin on skin to help promote better breastfeeding outcomes. Infant has been bottle feeding.  Jo Ann said she has breast fed but since her milk is not in she is supplementing.  Encouraged her to breast feed or pump with each time baby feeds to stimulate her milk to come in.  Will revisit as needed.    Francisca Hinds RN, IBCLC

## 2017-10-26 VITALS
OXYGEN SATURATION: 100 % | TEMPERATURE: 97.4 F | HEART RATE: 60 BPM | DIASTOLIC BLOOD PRESSURE: 62 MMHG | WEIGHT: 187 LBS | BODY MASS INDEX: 30.05 KG/M2 | SYSTOLIC BLOOD PRESSURE: 103 MMHG | HEIGHT: 66 IN | RESPIRATION RATE: 16 BRPM

## 2017-10-26 PROCEDURE — 25000132 ZZH RX MED GY IP 250 OP 250 PS 637: Performed by: OBSTETRICS & GYNECOLOGY

## 2017-10-26 RX ORDER — IBUPROFEN 400 MG/1
400-800 TABLET, FILM COATED ORAL EVERY 6 HOURS PRN
Qty: 120 TABLET | Refills: 0 | Status: SHIPPED | OUTPATIENT
Start: 2017-10-26 | End: 2018-08-19

## 2017-10-26 RX ORDER — ACETAMINOPHEN 325 MG/1
650 TABLET ORAL EVERY 4 HOURS PRN
Qty: 100 TABLET | Refills: 0 | Status: SHIPPED | OUTPATIENT
Start: 2017-10-26 | End: 2018-08-19

## 2017-10-26 RX ADMIN — IBUPROFEN 800 MG: 400 TABLET ORAL at 05:50

## 2017-10-26 RX ADMIN — ACETAMINOPHEN 650 MG: 325 TABLET, FILM COATED ORAL at 11:31

## 2017-10-26 RX ADMIN — ACETAMINOPHEN 650 MG: 325 TABLET, FILM COATED ORAL at 05:50

## 2017-10-26 RX ADMIN — SENNOSIDES AND DOCUSATE SODIUM 1 TABLET: 8.6; 5 TABLET ORAL at 07:57

## 2017-10-26 RX ADMIN — IBUPROFEN 800 MG: 400 TABLET ORAL at 11:30

## 2017-10-26 RX ADMIN — PRENATAL VIT W/ FE FUMARATE-FA TAB 27-0.8 MG 1 TABLET: 27-0.8 TAB at 07:57

## 2017-10-26 NOTE — PLAN OF CARE
Problem: Patient Care Overview  Goal: Plan of Care/Patient Progress Review  Outcome: Adequate for Discharge Date Met:  10/26/17  Doing well,vss,voiding with out difficulty,pain control with tylenol&ibuprofen,,baby breast feeding&supplementing EBM&formula with bottle.Plan to discharge later today&follow up in clinic 6 weeks or sooner if any concerns.

## 2017-10-26 NOTE — DISCHARGE SUMMARY
[facility]    Discharge Summary  Obstetrics    Date of Admission:  10/23/2017  Date of Discharge:  10/26/2017  Discharging Provider: Sania Valladares    Discharge Diagnoses   A2GDM  S/p VAVD    History of Present Illness   Jo Ann Mckeon is a 37 year old  admitted for IOL due to A2GDM on insulin at 39+ wks. Pregnancy otherwise uncomplicated. NST was Cat II in the second stage, thus delivery was accomplished via VAVD for fetal benefit.     Hospital Course   The patient's hospital course was unremarkable.  She recovered as anticipated and experienced no post-delivery complications. Blood sugars were appropriate and checks were discontinued PPD1. On discharge, her pain was well controlled. Vaginal bleeding appropriate.  Voiding without difficulty.  Ambulating well and tolerating a normal diet.  No fevers.  Breastfeeding with supplementation with formula due to low supply.  Infant stable.  She was discharged on post-partum day 2.    Post-partum hemoglobin:   Hemoglobin   Date Value Ref Range Status   10/23/2017 12.9 11.7 - 15.7 g/dL Final       Sania Valladares MD       Discharge Disposition   Discharged to home   Condition at discharge: Stable    Primary Care Physician   [facility]    Consultations This Hospital Stay   ANESTHESIOLOGY IP CONSULT  HOME CARE POST PARTUM/ IP CONSULT  LACTATION IP CONSULT    Discharge Orders     Activity   Review discharge instructions     Reason for your hospital stay   Maternity care     Discharge Instructions - Gestational diabetic patients   Gestational diabetic patients to follow up for fasting blood sugar and 2 hour 75gm glucose load at 6 weeks postpartum.     Diet   Resume previous diet       Discharge Medications   Current Discharge Medication List      START taking these medications    Details   acetaminophen (TYLENOL) 325 MG tablet Take 2 tablets (650 mg) by mouth every 4 hours as needed for mild pain  Qty: 100 tablet, Refills: 0     Associated Diagnoses:  (normal spontaneous vaginal delivery)      ibuprofen (ADVIL/MOTRIN) 400 MG tablet Take 1-2 tablets (400-800 mg) by mouth every 6 hours as needed for other (cramping)  Qty: 120 tablet, Refills: 0    Associated Diagnoses:  (normal spontaneous vaginal delivery)         CONTINUE these medications which have NOT CHANGED    Details   VITAMIN D, CHOLECALCIFEROL, PO Take 2,000 Units by mouth daily      PRENATAL VITAMINS PO Take 1 tablet by mouth daily.      Omega-3 Fatty Acids (FISH OIL PO) Take 1 tablet by mouth daily.         STOP taking these medications       insulin isophane human (HUMULIN N KWIKPEN) 100 UNIT/ML injection Comments:   Reason for Stopping:             Allergies   No Known Allergies

## 2017-10-26 NOTE — DISCHARGE INSTRUCTIONS

## 2017-10-26 NOTE — PLAN OF CARE
Problem: Patient Care Overview  Goal: Plan of Care/Patient Progress Review  Outcome: Improving  Fundus firm and bleeding wnl.  VSS.  Voiding without difficulty.  Taking tylenol and ibuprofen every 6 hours with good relief.  Pumping after feedings.  Up ad louise.  Encouraged to call with questions or concerns.  Will continue to monitor.

## 2017-10-26 NOTE — PROGRESS NOTES
"Jo Ann Talleytash  2017   PPD2 s/p VAVD    S: 37 year old  delivered at 39w2d due to A2GDM on insulin  Doing well without complaints.  Sore but medication helping.   Lochia minimal.   Ambulating.  Passing flatus, had BM yesterday.  Urinating without issues.  Breast- and bottlefeeding.    O: /68  Pulse 91  Temp 97.7  F (36.5  C) (Oral)  Resp 16  Ht 1.676 m (5' 6\")  Wt 84.8 kg (187 lb)  SpO2 100%  Breastfeeding? Unknown  BMI 30.18 kg/m2  Gen: NAD  Abd: soft, NT, mild distension, FF 2 below U  Ext: NT, nonedematous    A/P:  37 year old  PPD2 s/p VAVD after IOL for A2GDM  - blood sugars well-controlled postpartum, accuchecks discontinued yesterday - will require GTT in 6 wks  - ibuprofen PRN pain  - support breastfeeding, supplement PRN  - monitor lochia  - encourage ambulation  - regular diet  - routine PP care  - stable for discharge to home today    Sania Valladares MD  Text Page (8am - 5pm)    "

## 2018-08-19 ENCOUNTER — APPOINTMENT (OUTPATIENT)
Dept: GENERAL RADIOLOGY | Facility: CLINIC | Age: 38
End: 2018-08-19
Attending: NURSE PRACTITIONER

## 2018-08-19 ENCOUNTER — HOSPITAL ENCOUNTER (EMERGENCY)
Facility: CLINIC | Age: 38
Discharge: HOME | End: 2018-08-19
Attending: NURSE PRACTITIONER | Admitting: NURSE PRACTITIONER

## 2018-08-19 VITALS
RESPIRATION RATE: 16 BRPM | SYSTOLIC BLOOD PRESSURE: 124 MMHG | HEART RATE: 65 BPM | TEMPERATURE: 96.7 F | DIASTOLIC BLOOD PRESSURE: 68 MMHG | OXYGEN SATURATION: 100 %

## 2018-08-19 DIAGNOSIS — M25.572 ACUTE LEFT ANKLE PAIN: ICD-10-CM

## 2018-08-19 DIAGNOSIS — S93.492A SPRAIN OF OTHER LIGAMENT OF LEFT ANKLE, INITIAL ENCOUNTER: ICD-10-CM

## 2018-08-19 PROCEDURE — 25000132 ZZH RX MED GY IP 250 OP 250 PS 637: Performed by: NURSE PRACTITIONER

## 2018-08-19 PROCEDURE — 99283 EMERGENCY DEPT VISIT LOW MDM: CPT

## 2018-08-19 PROCEDURE — 73610 X-RAY EXAM OF ANKLE: CPT | Mod: LT

## 2018-08-19 RX ORDER — ACETAMINOPHEN 500 MG
500 TABLET ORAL ONCE
Status: COMPLETED | OUTPATIENT
Start: 2018-08-19 | End: 2018-08-19

## 2018-08-19 RX ADMIN — ACETAMINOPHEN 500 MG: 500 TABLET, FILM COATED ORAL at 21:08

## 2018-08-19 NOTE — ED AVS SNAPSHOT
[facility] Emergency Department    [address]    [facility] [address]    Phone:  [phone]    Fax:  [phone]                                       Jo Ann Mckeon   MRN: [id number]    Department:  [facility] Emergency Department   Date of Visit:  8/19/2018           Patient Information     Date Of Birth          1980        Your diagnoses for this visit were:     Acute left ankle pain     Sprain of other ligament of left ankle, initial encounter        You were seen by Ana Bazan, RAFAELA WEAVER.      Follow-up Information     Follow up with Clinic, [facility] In 1 week.    Contact information:    [address] [address] [phone]        Discharge References/Attachments     SPRAIN, ANKLE (ADULT) (ENGLISH)      24 Hour Appointment Hotline       To make an appointment at any [facility], call [phone] ([phone]). If you don't have a family doctor or clinic, we will help you find one. Street clinics are conveniently located to serve the needs of you and your family.             Review of your medicines      Our records show that you are taking the medicines listed below. If these are incorrect, please call your family doctor or clinic.        Dose / Directions Last dose taken    METFORMIN HCL PO        Refills:  0                Procedures and tests performed during your visit     XR Ankle Left G/E 3 Views      Orders Needing Specimen Collection     None      Pending Results     No orders found from 8/17/2018 to 8/20/2018.            Pending Culture Results     No orders found from 8/17/2018 to 8/20/2018.            Pending Results Instructions     If you had any lab results that were not finalized at the time of your Discharge, you can call the ED Lab Result RN at [phone]. You will be contacted by this team for any positive Lab results or changes in treatment. The nurses are available 7 days a week from 10A to  6:30P.  You can leave a message 24 hours per day and they will return your call.        Test Results From Your Hospital Stay        8/19/2018  8:37 PM      Narrative     XR ANKLE LT G/E 3 VW 8/19/2018 8:34 PM    HISTORY: Injury.    COMPARISON: None.    FINDINGS: Mortise joint is congruent. No fracture or malalignment.  Osseous structures appear normal.        Impression     IMPRESSION: No acute osseous abnormality.    CORA FAM MD                Clinical Quality Measure: Blood Pressure Screening     Your blood pressure was checked while you were in the emergency department today. The last reading we obtained was  BP: 124/68 . Please read the guidelines below about what these numbers mean and what you should do about them.  If your systolic blood pressure (the top number) is less than 120 and your diastolic blood pressure (the bottom number) is less than 80, then your blood pressure is normal. There is nothing more that you need to do about it.  If your systolic blood pressure (the top number) is 120-139 or your diastolic blood pressure (the bottom number) is 80-89, your blood pressure may be higher than it should be. You should have your blood pressure rechecked within a year by a primary care provider.  If your systolic blood pressure (the top number) is 140 or greater or your diastolic blood pressure (the bottom number) is 90 or greater, you may have high blood pressure. High blood pressure is treatable, but if left untreated over time it can put you at risk for heart attack, stroke, or kidney failure. You should have your blood pressure rechecked by a primary care provider within the next 4 weeks.  If your provider in the emergency department today gave you specific instructions to follow-up with your doctor or provider even sooner than that, you should follow that instruction and not wait for up to 4 weeks for your follow-up visit.        Thank you for choosing [facility]       Thank you for choosing [facility]  "for your care. Our goal is always to provide you with excellent care. Hearing back from our patients is one way we can continue to improve our services. Please take a few minutes to complete the written survey that you may receive in the mail after you visit with us. Thank you!        [employer] Information     [employer] lets you send messages to your doctor, view your test results, renew your prescriptions, schedule appointments and more. To sign up, go to [email or web address] . Click on \"Log in\" on the left side of the screen, which will take you to the Welcome page. Then click on \"Sign up Now\" on the right side of the page.     You will be asked to enter the access code listed below, as well as some personal information. Please follow the directions to create your username and password.     Your access code is: [email or web address]  Expires: 2018  9:02 PM     Your access code will  in 90 days. If you need help or a new code, please call your [facility] clinic or [phone].        Care EveryWhere ID     This is your Care EveryWhere ID. This could be used by other organizations to access your [facility] medical records  [id number]        Equal Access to Services     JAXON GUILLEN : Cristopher Rothman, kannan mahoney, imani fleming, jorge a lock. So [facility] [phone].    ATENCIÓN: Si habla español, tiene a vines disposición servicios gratuitos de asistencia lingüística. Llame al [phone].    We comply with applicable federal civil rights laws and [place] laws. We do not discriminate on the basis of race, color, national origin, age, disability, sex, sexual orientation, or gender identity.            After Visit Summary       This is your record. Keep this with you and show to your community pharmacist(s) and doctor(s) at your next visit.                  "

## 2018-08-19 NOTE — ED AVS SNAPSHOT
[facility] Emergency Department    [address]    [facility] [address]    Phone:  [phone]    Fax:  [phone]                                       Jo Ann Mckeon   MRN: [id number]    Department:  [facility] Emergency Department   Date of Visit:  8/19/2018           After Visit Summary Signature Page     I have received my discharge instructions, and my questions have been answered. I have discussed any challenges I see with this plan with the nurse or doctor.    ..........................................................................................................................................  Patient/Patient Representative Signature      ..........................................................................................................................................  Patient Representative Print Name and Relationship to Patient    ..................................................               ................................................  Date                                            Time    ..........................................................................................................................................  Reviewed by Signature/Title    ...................................................              ..............................................  Date                                                            Time

## 2018-08-20 NOTE — ED PROVIDER NOTES
History     Chief Complaint:  Ankle Pain    HPI   Jo Ann Mckeon is a 38 year old female with a history of diabetes who presents to the emergency department today for evaluation of ankle pain. The patient reports she was walking into her garage today when she missed a step and fell. After this, she began to have left leg pain which is worse over the left foot. This prompted her to present to the emergency department.     Allergies:  No Known Drug Allergies    Medications:    Metfornin    Past Medical History:    Diabetes    Past Surgical History:    No pertinent past surgical history    Family History:    History reviewed. No pertinent family history.     Social History:  The patient was accompanied to the ED by her mother in law.  Smoking Status: never  Alcohol Use: no  Marital Status:        Review of Systems   Musculoskeletal:        Left foot and ankle pain.    All other systems reviewed and are negative.    Physical Exam   First Vitals: /68  Pulse 65  Temp 96.7  F (35.9  C) (Temporal)  Resp 16  SpO2 100%    Physical Exam  Eyes: Pupils equally round  HENT: Head is normal in appearance. Oropharynx is normal with moist mucus membranes.  Cardiovascular: Normal color of mucus membranes  Respiratory: Normal respiratory effort  Musculoskeletal: Left ankle pain with dorsiflexion, pulses intact, normal ROM to ankle and knee, no bony tenderness to lateral or medial foot. No soft tissue swelling.   Skin: Normal, without rash.  Neurologic: Cranial nerves grossly intact, normal cognition, no apparent deficits.  Psychiatric: Normal affect.      Emergency Department Course     Imaging:  Radiology findings were communicated with the patient who voiced understanding of the findings.    XR Ankle Left G/E 3 Views   Final Result   IMPRESSION: No acute osseous abnormality.      CORA FAM MD         Interventions:  2108: Tylenol 500 mg PO       Emergency Department Course:  Nursing notes and vitals reviewed.  I  performed an exam of the patient as documented above.   The patient was sent for a XR Ankle Left G/E 3 Views while in the emergency department, results above.     2050: Patient rechecked and updated.     I personally reviewed the imaging results with the Patient and answered all related questions prior to  Discharge.  Findings and plan explained to the Patient. Patient discharged home with instructions regarding supportive care, medications, and reasons to return. The importance of close follow-up was reviewed.     Impression & Plan      Medical Decision Making:  Jo Ann Mckeon is a 38 year old female who presents for evaluation of ankle pain.  Signs and symptoms are consistent with an ankle sprain. No signs of septic arthritis, gout, pseudogout, fracture, cellulitis, etc.  There are no signs of fracture.  The patients neurovascular status is normal. A head to toe trauma exam is otherwise negative; the likelihood of other serious sequelae of trauma (spine, head, chest, other extremities, pelvis) is low.  Plan is for protected weightbearing, RICE treatment with ice 15 minutes on, 1 hour off, ace wrap.  Patient will advance weightbearing and follow-up with primary in 2-3 days.  They will begin gentle ROM exercises of the ankle including.     Diagnosis:      1. Acute left ankle pain     Disposition:  discharged to home    Scribe Disclosure:  I, Jerad Sood, am serving as a scribe at 7:46 PM on 8/19/2018 to document services personally performed by Ana Bazan, RAFAELA ENCISO based on my observations and the provider's statements to me.     8/19/2018   [facility] EMERGENCY DEPARTMENT       Ana Bazan APRN CNP  08/19/18 2151

## 2018-11-26 ENCOUNTER — OFFICE VISIT (OUTPATIENT)
Dept: FAMILY MEDICINE | Facility: CLINIC | Age: 38
End: 2018-11-26
Payer: COMMERCIAL

## 2018-11-26 VITALS
OXYGEN SATURATION: 99 % | WEIGHT: 166.1 LBS | BODY MASS INDEX: 26.69 KG/M2 | HEIGHT: 66 IN | SYSTOLIC BLOOD PRESSURE: 112 MMHG | TEMPERATURE: 98.2 F | HEART RATE: 74 BPM | DIASTOLIC BLOOD PRESSURE: 82 MMHG | RESPIRATION RATE: 16 BRPM

## 2018-11-26 DIAGNOSIS — M54.41 ACUTE BILATERAL LOW BACK PAIN WITH BILATERAL SCIATICA: ICD-10-CM

## 2018-11-26 DIAGNOSIS — M54.6 ACUTE BILATERAL THORACIC BACK PAIN: Primary | ICD-10-CM

## 2018-11-26 DIAGNOSIS — M54.42 ACUTE BILATERAL LOW BACK PAIN WITH BILATERAL SCIATICA: ICD-10-CM

## 2018-11-26 PROCEDURE — 99203 OFFICE O/P NEW LOW 30 MIN: CPT | Performed by: NURSE PRACTITIONER

## 2018-11-26 RX ORDER — CYCLOBENZAPRINE HCL 10 MG
5 TABLET ORAL 3 TIMES DAILY PRN
Qty: 30 TABLET | Refills: 0 | Status: SHIPPED | OUTPATIENT
Start: 2018-11-26 | End: 2018-12-18

## 2018-11-26 RX ORDER — NAPROXEN 500 MG/1
500 TABLET ORAL 2 TIMES DAILY PRN
Qty: 30 TABLET | Refills: 1 | Status: SHIPPED | OUTPATIENT
Start: 2018-11-26 | End: 2022-05-04

## 2018-11-26 ASSESSMENT — PAIN SCALES - GENERAL: PAINLEVEL: MODERATE PAIN (5)

## 2018-11-26 NOTE — PROGRESS NOTES
SUBJECTIVE:   Jo Ann Mckeon is a 38 year old female who presents to clinic today for the following health issues:    New Patient/Transfer of Care  Back Pain       Duration: 2 weeks        Specific cause: possibly from last baby when patient had a epi-dural (baby was born in Oct 2017)    Description:   Location of pain: low back bilateral, middle of back bilateral, upper back bilateral and neck bilateral  Character of pain: achy, burning and intermittent  Pain radiation:none  New numbness or weakness in legs, not attributed to pain:  YES- both legs; pain sometimes goes down the back of legs to her foot    Intensity: Currently 5/10, At its worst 9/10    History:   Pain interferes with job: Not applicable  History of back problems: no prior back problems  Any previous MRI or X-rays: None  Sees a specialist for back pain:  No  Therapies tried without relief: advil     Alleviating factors:   Improved by: OTC-pain cream (aspercream)        Precipitating factors:  Worsened by: Lifting and walking upstairs     Perfecto STarT Back    Most recent score:    Circl BACK TOTAL SCORE 11/26/2018   Total Score (all 9) 2          Accompanying Signs & Symptoms:  Risk of Fracture:  None  Risk of Cauda Equina:  None  Risk of Infection:  None  Risk of Cancer:  None  Risk of Ankylosing Spondylitis:  Onset at age <35, male, AND morning back stiffness. 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  Ongoing pain in her back for the past 2 weeks.      Problem list and histories reviewed & adjusted, as indicated.  Additional history: as documented    Current Outpatient Prescriptions   Medication Sig Dispense Refill     metFORMIN (GLUCOPHAGE) 500 MG tablet Take 500 mg by mouth 2 times daily (with meals)       VITAMIN D, CHOLECALCIFEROL, PO Take by mouth daily       [DISCONTINUED] METFORMIN HCL PO        No Known Allergies    Reviewed and updated as needed this visit by clinical staff  Tobacco  Allergies  Meds  Problems   "Med Hx  Surg Hx  Fam Hx  Soc Hx        Reviewed and updated as needed this visit by Provider         ROS:  Constitutional, HEENT, cardiovascular, pulmonary, gi and gu systems are negative, except as otherwise noted.    OBJECTIVE:     /82 (BP Location: Right arm, Patient Position: Sitting, Cuff Size: Adult Regular)  Pulse 74  Temp 98.2  F (36.8  C) (Oral)  Resp 16  Ht 5' 5.75\" (1.67 m)  Wt 166 lb 1.6 oz (75.3 kg)  SpO2 99%  BMI 27.01 kg/m2  Body mass index is 27.01 kg/(m^2).  GENERAL: healthy, alert and no distress  MS: no gross musculoskeletal defects noted, back: no spinous tenderness, bilat paralumbar tenderness, FROM, gait normal, SLR normal bilat  SKIN: no suspicious lesions or rashes on exposed skin   NEURO: Normal strength and tone, DTRs intact/eqaul   PSYCH: mentation appears normal, affect normal/bright    Diagnostic Test Results:  none     ASSESSMENT/PLAN:   1. Acute bilateral thoracic back pain  Essentially normal exam.  Suspect muscular in nature.  Rest, heat, can use tylenol in addition to NSAID.  Avoid lifting/carrying young children.    - cyclobenzaprine (FLEXERIL) 10 MG tablet; Take 0.5 tablets (5 mg) by mouth 3 times daily as needed for muscle spasms  Dispense: 30 tablet; Refill: 0  - naproxen (NAPROSYN) 500 MG tablet; Take 1 tablet (500 mg) by mouth 2 times daily as needed for moderate pain  Dispense: 30 tablet; Refill: 1    2. Acute bilateral low back pain with bilateral sciatica  Essentially normal exam.  Suspect muscular in nature.  Rest, heat, can use tylenol in addition to NSAID.  Avoid lifting/carrying young children.  - cyclobenzaprine (FLEXERIL) 10 MG tablet; Take 0.5 tablets (5 mg) by mouth 3 times daily as needed for muscle spasms  Dispense: 30 tablet; Refill: 0  - naproxen (NAPROSYN) 500 MG tablet; Take 1 tablet (500 mg) by mouth 2 times daily as needed for moderate pain  Dispense: 30 tablet; Refill: 1    Please abstract the following data from this visit with this patient " into the appropriate field in Epic:    Pap smear done on this date: 5/6/2016 (approximately), by this group: [facility], results were normal/negative.  HPV negative.       F/u 2-4 weeks, sooner if worsening     RAFAELA Denson [facility]

## 2018-11-26 NOTE — NURSING NOTE
"Chief Complaint   Patient presents with     New Patient     Back Pain     Initial /82 (BP Location: Right arm, Patient Position: Sitting, Cuff Size: Adult Regular)  Pulse 74  Temp 98.2  F (36.8  C) (Oral)  Resp 16  Ht 5' 5.75\" (1.67 m)  Wt 166 lb 1.6 oz (75.3 kg)  SpO2 99%  BMI 27.01 kg/m2 Estimated body mass index is 27.01 kg/(m^2) as calculated from the following:    Height as of this encounter: 5' 5.75\" (1.67 m).    Weight as of this encounter: 166 lb 1.6 oz (75.3 kg).  BP completed using cuff size regular right arm    Lisa Magill, CMA    "

## 2018-11-26 NOTE — Clinical Note
Please abstract the following data from this visit with this patient into the appropriate field in Epic: See results in [facility].  Pap smear done on this date: 5/6/2016 (approximately), by this group: [facility], results were normal/negative.  HPV negative.

## 2018-11-26 NOTE — MR AVS SNAPSHOT
"              After Visit Summary   11/26/2018    Jo Ann Mckeon    MRN: [id number]           Patient Information     Date Of Birth          1980        Visit Information        Provider Department      11/26/2018 11:20 AM Libia Connor APRN CNP [facility]        Today's Diagnoses     Acute bilateral thoracic back pain    -  1    Acute bilateral low back pain with bilateral sciatica           Follow-ups after your visit        Follow-up notes from your care team     Return in about 2 weeks (around 12/10/2018) for back pain .      Who to contact     If you have questions or need follow up information about today's clinic visit or your schedule please contact [facility] directly at [phone].  Normal or non-critical lab and imaging results will be communicated to you by [employer], letter or phone within 4 business days after the clinic has received the results. If you do not hear from us within 7 days, please contact the clinic through [employer] or phone. If you have a critical or abnormal lab result, we will notify you by phone as soon as possible.  Submit refill requests through [employer] or call your pharmacy and they will forward the refill request to us. Please allow 3 business days for your refill to be completed.          Additional Information About Your Visit        MyChart Information     [employer] lets you send messages to your doctor, view your test results, renew your prescriptions, schedule appointments and more. To sign up, go to [email or web address] . Click on \"Log in\" on the left side of the screen, which will take you to the Welcome page. Then click on \"Sign up Now\" on the right side of the page.     You will be asked to enter the access code listed below, as well as some personal information. Please follow the directions to create your username and password.     Your access code is: [email or web address]  Expires: 2/24/2019 12:08 PM     Your access code will " " in 90 days. If you need help or a new code, please call your [facility] clinic or [phone].        Care EveryWhere ID     This is your Care EveryWhere ID. This could be used by other organizations to access your [facility] medical records  [id number]        Your Vitals Were     Pulse Temperature Respirations Height Pulse Oximetry Breastfeeding?    74 98.2  F (36.8  C) (Oral) 16 5' 5.75\" (1.67 m) 99% No    BMI (Body Mass Index)                   27.01 kg/m2            Blood Pressure from Last 3 Encounters:   18 112/82   18 124/68   10/26/17 103/62    Weight from Last 3 Encounters:   18 166 lb 1.6 oz (75.3 kg)   10/23/17 187 lb (84.8 kg)   13 147 lb (66.7 kg)              Today, you had the following     No orders found for display         Today's Medication Changes          These changes are accurate as of 18 12:09 PM.  If you have any questions, ask your nurse or doctor.               Start taking these medicines.        Dose/Directions    cyclobenzaprine 10 MG tablet   Commonly known as:  FLEXERIL   Used for:  Acute bilateral low back pain with bilateral sciatica, Acute bilateral thoracic back pain   Started by:  Libia Connor APRN CNP        Dose:  5 mg   Take 0.5 tablets (5 mg) by mouth 3 times daily as needed for muscle spasms   Quantity:  30 tablet   Refills:  0       naproxen 500 MG tablet   Commonly known as:  NAPROSYN   Used for:  Acute bilateral low back pain with bilateral sciatica, Acute bilateral thoracic back pain   Started by:  Libia Connor APRN CNP        Dose:  500 mg   Take 1 tablet (500 mg) by mouth 2 times daily as needed for moderate pain   Quantity:  30 tablet   Refills:  1         These medicines have changed or have updated prescriptions.        Dose/Directions    metFORMIN 500 MG tablet   Commonly known as:  GLUCOPHAGE   This may have changed:  Another medication with the same name was removed. Continue taking this medication, and " follow the directions you see here.   Changed by:  Libia Connor APRN CNP        Dose:  500 mg   Take 500 mg by mouth 2 times daily (with meals)   Refills:  0            Where to get your medicines      These medications were sent to [facility]/pharmacy #[id number] - [place], [place] - [address]  [address], [facility] [address]     Phone:  [phone]     cyclobenzaprine 10 MG tablet    naproxen 500 MG tablet                Primary Care Provider Office Phone # Fax #    [facility] [phone] [address] [facility], [place] [address]        Equal Access to Services     [facility] : Hadii aad ku hadasho Soomaali, waaxda luqadaha, qaybta kaalmada adeegyada, jorge a kelly . So [facility] [phone].    ATENCIÓN: Si habla español, tiene a vines disposición servicios gratuitos de asistencia lingüística. Llame al [phone].    We comply with applicable federal civil rights laws and [place] laws. We do not discriminate on the basis of race, color, national origin, age, disability, sex, sexual orientation, or gender identity.            Thank you!     Thank you for choosing [facility]  for your care. Our goal is always to provide you with excellent care. Hearing back from our patients is one way we can continue to improve our services. Please take a few minutes to complete the written survey that you may receive in the mail after your visit with us. Thank you!             Your Updated Medication List - Protect others around you: Learn how to safely use, store and throw away your medicines at www.disposemymeds.org.          This list is accurate as of 11/26/18 12:09 PM.  Always use your most recent med list.                   Brand Name Dispense Instructions for use Diagnosis    cyclobenzaprine 10 MG tablet    FLEXERIL    30 tablet    Take 0.5 tablets (5 mg) by mouth 3 times daily as needed for muscle spasms    Acute bilateral low  back pain with bilateral sciatica, Acute bilateral thoracic back pain       metFORMIN 500 MG tablet    GLUCOPHAGE     Take 500 mg by mouth 2 times daily (with meals)        naproxen 500 MG tablet    NAPROSYN    30 tablet    Take 1 tablet (500 mg) by mouth 2 times daily as needed for moderate pain    Acute bilateral low back pain with bilateral sciatica, Acute bilateral thoracic back pain       VITAMIN D (CHOLECALCIFEROL) PO      Take by mouth daily

## 2018-12-18 DIAGNOSIS — M54.41 ACUTE BILATERAL LOW BACK PAIN WITH BILATERAL SCIATICA: ICD-10-CM

## 2018-12-18 DIAGNOSIS — M54.42 ACUTE BILATERAL LOW BACK PAIN WITH BILATERAL SCIATICA: ICD-10-CM

## 2018-12-18 DIAGNOSIS — M54.6 ACUTE BILATERAL THORACIC BACK PAIN: ICD-10-CM

## 2018-12-18 RX ORDER — CYCLOBENZAPRINE HCL 10 MG
5 TABLET ORAL 3 TIMES DAILY PRN
Qty: 30 TABLET | Refills: 0 | Status: SHIPPED | OUTPATIENT
Start: 2018-12-18 | End: 2022-05-04

## 2018-12-18 NOTE — TELEPHONE ENCOUNTER
Requested Prescriptions   Pending Prescriptions Disp Refills     cyclobenzaprine (FLEXERIL) 10 MG tablet [Pharmacy Med Name: CYCLOBENZAPRINE 10 MG TABLET] 30 tablet 0     Sig: TAKE 0.5 TABLETS (5 MG) BY MOUTH 3 TIMES DAILY AS NEEDED FOR MUSCLE SPASMS    There is no refill protocol information for this order      Last Written Prescription Date:  11/26/18  Last Fill Quantity: 30,  # refills: 0   Last Office Visit: 11/26/2018 [facility]      Return in about 2 weeks (around 12/10/2018) for back pain .     Future Office Visit:         Routing refill request to provider for review/approval because:  Drug not on the G, P or [facility] refill protocol or controlled substance

## 2018-12-29 ENCOUNTER — HOSPITAL ENCOUNTER (EMERGENCY)
Facility: CLINIC | Age: 38
Discharge: HOME | End: 2018-12-29
Attending: EMERGENCY MEDICINE | Admitting: EMERGENCY MEDICINE
Payer: COMMERCIAL

## 2018-12-29 ENCOUNTER — APPOINTMENT (OUTPATIENT)
Dept: CT IMAGING | Facility: CLINIC | Age: 38
End: 2018-12-29
Attending: EMERGENCY MEDICINE
Payer: COMMERCIAL

## 2018-12-29 VITALS
BODY MASS INDEX: 25.71 KG/M2 | OXYGEN SATURATION: 100 % | HEART RATE: 79 BPM | RESPIRATION RATE: 16 BRPM | DIASTOLIC BLOOD PRESSURE: 79 MMHG | SYSTOLIC BLOOD PRESSURE: 114 MMHG | TEMPERATURE: 98.1 F | WEIGHT: 160 LBS | HEIGHT: 66 IN

## 2018-12-29 DIAGNOSIS — S32.009A CLOSED FRACTURE OF TRANSVERSE PROCESS OF LUMBAR VERTEBRA, INITIAL ENCOUNTER (H): ICD-10-CM

## 2018-12-29 DIAGNOSIS — R91.8 PULMONARY NODULES: ICD-10-CM

## 2018-12-29 LAB
ALBUMIN UR-MCNC: NEGATIVE MG/DL
ANION GAP SERPL CALCULATED.3IONS-SCNC: 3 MMOL/L (ref 3–14)
APPEARANCE UR: ABNORMAL
B-HCG FREE SERPL-ACNC: <5 IU/L
BASOPHILS # BLD AUTO: 0 10E9/L (ref 0–0.2)
BASOPHILS NFR BLD AUTO: 0.2 %
BILIRUB UR QL STRIP: NEGATIVE
BUN SERPL-MCNC: 16 MG/DL (ref 7–30)
CALCIUM SERPL-MCNC: 8.5 MG/DL (ref 8.5–10.1)
CHLORIDE SERPL-SCNC: 106 MMOL/L (ref 94–109)
CO2 SERPL-SCNC: 27 MMOL/L (ref 20–32)
COLOR UR AUTO: YELLOW
CREAT SERPL-MCNC: 0.81 MG/DL (ref 0.52–1.04)
DIFFERENTIAL METHOD BLD: ABNORMAL
EOSINOPHIL # BLD AUTO: 0.1 10E9/L (ref 0–0.7)
EOSINOPHIL NFR BLD AUTO: 0.5 %
ERYTHROCYTE [DISTWIDTH] IN BLOOD BY AUTOMATED COUNT: 12.7 % (ref 10–15)
GFR SERPL CREATININE-BSD FRML MDRD: >90 ML/MIN/{1.73_M2}
GLUCOSE SERPL-MCNC: 95 MG/DL (ref 70–99)
GLUCOSE UR STRIP-MCNC: NEGATIVE MG/DL
HCT VFR BLD AUTO: 43.2 % (ref 35–47)
HGB BLD-MCNC: 14 G/DL (ref 11.7–15.7)
HGB UR QL STRIP: NEGATIVE
IMM GRANULOCYTES # BLD: 0 10E9/L (ref 0–0.4)
IMM GRANULOCYTES NFR BLD: 0.3 %
KETONES UR STRIP-MCNC: NEGATIVE MG/DL
LEUKOCYTE ESTERASE UR QL STRIP: ABNORMAL
LYMPHOCYTES # BLD AUTO: 1.6 10E9/L (ref 0.8–5.3)
LYMPHOCYTES NFR BLD AUTO: 12.2 %
MCH RBC QN AUTO: 27.7 PG (ref 26.5–33)
MCHC RBC AUTO-ENTMCNC: 32.4 G/DL (ref 31.5–36.5)
MCV RBC AUTO: 86 FL (ref 78–100)
MONOCYTES # BLD AUTO: 0.5 10E9/L (ref 0–1.3)
MONOCYTES NFR BLD AUTO: 3.8 %
NEUTROPHILS # BLD AUTO: 10.7 10E9/L (ref 1.6–8.3)
NEUTROPHILS NFR BLD AUTO: 83 %
NITRATE UR QL: NEGATIVE
NRBC # BLD AUTO: 0 10*3/UL
NRBC BLD AUTO-RTO: 0 /100
PH UR STRIP: 5 PH (ref 5–7)
PLATELET # BLD AUTO: 239 10E9/L (ref 150–450)
POTASSIUM SERPL-SCNC: 3.8 MMOL/L (ref 3.4–5.3)
RBC # BLD AUTO: 5.05 10E12/L (ref 3.8–5.2)
RBC #/AREA URNS AUTO: 1 /HPF (ref 0–2)
SODIUM SERPL-SCNC: 136 MMOL/L (ref 133–144)
SOURCE: ABNORMAL
SP GR UR STRIP: 1.02 (ref 1–1.03)
SQUAMOUS #/AREA URNS AUTO: 4 /HPF (ref 0–1)
UROBILINOGEN UR STRIP-MCNC: 0 MG/DL (ref 0–2)
WBC # BLD AUTO: 12.9 10E9/L (ref 4–11)
WBC #/AREA URNS AUTO: 2 /HPF (ref 0–5)

## 2018-12-29 PROCEDURE — 85025 COMPLETE CBC W/AUTO DIFF WBC: CPT | Performed by: EMERGENCY MEDICINE

## 2018-12-29 PROCEDURE — 84702 CHORIONIC GONADOTROPIN TEST: CPT

## 2018-12-29 PROCEDURE — 74177 CT ABD & PELVIS W/CONTRAST: CPT

## 2018-12-29 PROCEDURE — 25000128 H RX IP 250 OP 636: Performed by: EMERGENCY MEDICINE

## 2018-12-29 PROCEDURE — 80048 BASIC METABOLIC PNL TOTAL CA: CPT | Performed by: EMERGENCY MEDICINE

## 2018-12-29 PROCEDURE — 96374 THER/PROPH/DIAG INJ IV PUSH: CPT | Mod: 59

## 2018-12-29 PROCEDURE — 76705 ECHO EXAM OF ABDOMEN: CPT

## 2018-12-29 PROCEDURE — 99285 EMERGENCY DEPT VISIT HI MDM: CPT | Mod: 25

## 2018-12-29 PROCEDURE — 81001 URINALYSIS AUTO W/SCOPE: CPT | Performed by: EMERGENCY MEDICINE

## 2018-12-29 PROCEDURE — 96361 HYDRATE IV INFUSION ADD-ON: CPT

## 2018-12-29 RX ORDER — IOPAMIDOL 755 MG/ML
500 INJECTION, SOLUTION INTRAVASCULAR ONCE
Status: COMPLETED | OUTPATIENT
Start: 2018-12-29 | End: 2018-12-29

## 2018-12-29 RX ORDER — HYDROCODONE BITARTRATE AND ACETAMINOPHEN 5; 325 MG/1; MG/1
1 TABLET ORAL EVERY 6 HOURS PRN
Qty: 10 TABLET | Refills: 0 | Status: SHIPPED | OUTPATIENT
Start: 2018-12-29 | End: 2019-01-01

## 2018-12-29 RX ORDER — SODIUM CHLORIDE 9 MG/ML
1000 INJECTION, SOLUTION INTRAVENOUS CONTINUOUS
Status: DISCONTINUED | OUTPATIENT
Start: 2018-12-29 | End: 2018-12-29 | Stop reason: HOSPADM

## 2018-12-29 RX ORDER — KETOROLAC TROMETHAMINE 15 MG/ML
15 INJECTION, SOLUTION INTRAMUSCULAR; INTRAVENOUS ONCE
Status: COMPLETED | OUTPATIENT
Start: 2018-12-29 | End: 2018-12-29

## 2018-12-29 RX ADMIN — KETOROLAC TROMETHAMINE 15 MG: 15 INJECTION, SOLUTION INTRAMUSCULAR; INTRAVENOUS at 16:56

## 2018-12-29 RX ADMIN — SODIUM CHLORIDE 1000 ML: 9 INJECTION, SOLUTION INTRAVENOUS at 15:16

## 2018-12-29 RX ADMIN — IOPAMIDOL 81 ML: 755 INJECTION, SOLUTION INTRAVENOUS at 16:37

## 2018-12-29 RX ADMIN — SODIUM CHLORIDE 61 ML: 9 INJECTION, SOLUTION INTRAVENOUS at 16:36

## 2018-12-29 RX ADMIN — SODIUM CHLORIDE 61 ML: 9 INJECTION, SOLUTION INTRAVENOUS at 16:37

## 2018-12-29 ASSESSMENT — ENCOUNTER SYMPTOMS
NAUSEA: 0
WEAKNESS: 0
SHORTNESS OF BREATH: 1
NUMBNESS: 0
BACK PAIN: 1
NECK PAIN: 0
ABDOMINAL PAIN: 1
VOMITING: 0
HEADACHES: 0
MYALGIAS: 1

## 2018-12-29 ASSESSMENT — MIFFLIN-ST. JEOR: SCORE: 1427.51

## 2018-12-29 NOTE — ED TRIAGE NOTES
Pt missed last step and fell onto left side striking back on steps. Complains of left sided back pain and generalized abdominal discomfort. Incident occurred at 1200.

## 2018-12-29 NOTE — ED AVS SNAPSHOT
[facility] Emergency Department  [address]  [facility] [address]  Phone:  [phone]  Fax:  [phone]                                    Jo Ann Mckeon   MRN: [id number]    Department:  [facility] Emergency Department   Date of Visit:  12/29/2018           After Visit Summary Signature Page    I have received my discharge instructions, and my questions have been answered. I have discussed any challenges I see with this plan with the nurse or doctor.    ..........................................................................................................................................  Patient/Patient Representative Signature      ..........................................................................................................................................  Patient Representative Print Name and Relationship to Patient    ..................................................               ................................................  Date                                   Time    ..........................................................................................................................................  Reviewed by Signature/Title    ...................................................              ..............................................  Date                                               Time          22EPIC Rev 08/18

## 2018-12-29 NOTE — ED PROVIDER NOTES
"  History     Chief Complaint:  Fall    The history is provided by the patient.      Jo Ann Mckeon is a 38 year old female with history of type 2 diabetes who presents for evaluation after falling down the stairs. The patient reports that about 1 hour ago, she was going up the stairs while holding her 12 month old child, then changed her mind, then turned around on the lower step.  She stumbled then fell backward, hitting her left flank on the step. She states that immediately after the fall, she felt the wind knocked out of her, sharp left sided back pain, and abdominal pain. The patient states that she was unable to get herself off of the floor due to pain. She denies numbness or weakness. The patient's  contacted EMS and she was transported here.   Here, the patient reports sharp left lower abdominal pain, back pain, and shortness of breath that all exacerbate with movement. She denies head trauma or headache.     Allergies:  No known drug allergies      Medications:    Flexeril  Naproxen  Metformin    Past Medical History:    Type 2 diabetes mellitus    Past Surgical History:    Appendectomy     Family History:    History reviewed. No pertinent family history.      Social History:  The patient presents to the ED with   Marital Status:    Smoking status: never  Alcohol use: No     Review of Systems   Eyes: Negative for visual disturbance.   Respiratory: Positive for shortness of breath.    Cardiovascular: Negative for chest pain.   Gastrointestinal: Positive for abdominal pain. Negative for nausea and vomiting.   Musculoskeletal: Positive for back pain and myalgias. Negative for neck pain.   Neurological: Negative for syncope, weakness, numbness and headaches.   All other systems reviewed and are negative.      Physical Exam     Patient Vitals for the past 24 hrs:   BP Temp Temp src Pulse Resp SpO2 Height Weight   12/29/18 1415 114/79 98.1  F (36.7  C) Oral 79 16 100 % 1.676 m (5' 6\") 72.6 kg " (160 lb)        Physical Exam  Gen: Pleasant, appears stated age.    Eye:   Pupils are equal, round, and reactive.     Sclera non-injected.    ENT:   Moist mucus membranes.     Normal tongue.    Oropharynx without lesions.    Cardiac:     Normal rate and regular rhythm.    No murmurs, gallops, or rubs.    Pulmonary:     Clear to auscultation bilaterally.    No wheezes, rales, or rhonchi.    Abdomen:    Left flank pain.  No crepitus or bruising.   LLQ abdominal pain.   Normal active bowel sounds.     Abdomen is soft and non-distended, without focal tenderness.    Musculoskeletal:     Normal movement of all extremities without evidence for deficit.    Extremities:    No edema.    Skin:   Warm and dry.    Neurologic:    Non-focal exam without asymmetric weakness or numbness.    Normal tone    Psychiatric:     Normal affect with appropriate interaction with examiner.       Emergency Department Course     Imaging:  Radiographic findings were communicated with the patient who voiced understanding of the findings.    CT Abdomen Pelvis w Contrast  Impression: 1. Nondisplaced fractures through the left transverse processes of L1  and L3. No other acute abnormality.  2. Small benign-appearing pulmonary nodule in the right lower lobe  measuring 0.5 cm. Follow-up chest CT in 12 months recommended.  As read by Radiology.     Laboratory:  ISTAT HCG quant. pregnancy: <5.0    CBC: WBC 12.9(H), o/w WNL (HGB 14.0, )     BMP: creatinine 0.86    UA with microscopic: leukocyte esterase urine moderate, squamous epithelial/HPF 4, o/w WNL      Procedures:  Results for orders placed during the hospital encounter of 12/29/18   POC US ABDOMEN LIMITED    Impression [facility] Procedure Note      FAST (Focused Assessment with Sonography for Trauma):    PROCEDURE: PERFORMED BY: Dr. Deb Randhawa  INDICATIONS/SYMPTOM:  Abdominal Pain  PROBE: Low frequency convex probe  BODY LOCATION: The ultrasound was performed in the abdominal  and subxiphoid areas.  FINDINGS: No evidence of free fluid in hepatorenal (Morison's pouch), perisplenic, or and pelvic areas. No evidence of pericardial effusion.      INTERPRETATION: The FAST exam was normal. There was no free fluid present. There was no pericardial effusion.         Interventions:  1516: NS 1L IV Bolus   1656: Toradol 15 mg, IV     Emergency Department Course:  The patient arrived in the emergency department via EMS.   Past medical records, nursing notes, and vitals reviewed.  1437: I performed an exam of the patient and obtained history, as documented above. GCS 15.  IV inserted and blood drawn.   The patient was sent for a US and CT scan while in the emergency department, findings above.      1710: I rechecked the patient. Explained findings to the patient.    I rechecked the patient. Findings and plan explained to the Patient. Patient discharged home with instructions regarding supportive care, medications, and reasons to return. The importance of close follow-up was reviewed.        Impression & Plan      Medical Decision Making:  Jo Ann Mckeon is a 38 year old female with history of type 2 diabetes who presents for evaluation of left flank pain after falling down the stairs. On exam, the patient has normal vitals but complains of pain that is over the left flank radiating into the LLQ. FAST exam is negative. Labs are unremarkable including UA which does not demonstrate any hematuria. CT of the abdomen/pelvis was performed to evaluate for solid organ injury, rib fracture, or vertebral body fracture. Fortunately, there is no evidence of solid organ injury to either spleen or kidney. She does have nondisplaced fractures of the transverse processes involving L1 and L3. I also informed her of a pulmonary nodule in the right lobe that requires follow up with PCP. These fractures appear stable and there is no other evident fractures on CT. She will be discharged home to follow up with spine as an  outpatient. She was provided with pain medication which she will take for break through pain only, and otherwise I have instructed her to take ibuprofen as needed. She will return to the ED for increasing pain, numbness or weakness, or any other concerns.    Diagnosis:    ICD-10-CM   1. Closed fracture of transverse process of lumbar vertebra, initial encounter (H) S32.009A   2. Pulmonary nodule R91.8       Disposition:  discharged to home    Discharge Medications:     Medication List      Started    HYDROcodone-acetaminophen 5-325 MG tablet  Commonly known as:  NORCO  1 tablet, Oral, EVERY 6 HOURS PRN              Megan Beh  12/29/2018   [facility] EMERGENCY DEPARTMENT  I, Megan Beh, am serving as a scribe at 2:37 PM on 12/29/2018 to document services personally performed by Deb Randhawa MD based on my observations and the provider's statements to me.       Deb Randhawa MD  12/29/18 2912

## 2019-01-07 ENCOUNTER — OFFICE VISIT (OUTPATIENT)
Dept: FAMILY MEDICINE | Facility: CLINIC | Age: 39
End: 2019-01-07
Payer: COMMERCIAL

## 2019-01-07 VITALS
RESPIRATION RATE: 16 BRPM | DIASTOLIC BLOOD PRESSURE: 70 MMHG | TEMPERATURE: 98.7 F | SYSTOLIC BLOOD PRESSURE: 108 MMHG | WEIGHT: 168 LBS | HEIGHT: 66 IN | HEART RATE: 84 BPM | BODY MASS INDEX: 27 KG/M2 | OXYGEN SATURATION: 100 %

## 2019-01-07 DIAGNOSIS — S32.009D CLOSED FRACTURE OF TRANSVERSE PROCESS OF LUMBAR VERTEBRA WITH ROUTINE HEALING, SUBSEQUENT ENCOUNTER: Primary | ICD-10-CM

## 2019-01-07 DIAGNOSIS — R91.8 PULMONARY NODULES: ICD-10-CM

## 2019-01-07 PROCEDURE — 99214 OFFICE O/P EST MOD 30 MIN: CPT | Performed by: PHYSICIAN ASSISTANT

## 2019-01-07 ASSESSMENT — MIFFLIN-ST. JEOR: SCORE: 1463.79

## 2019-01-07 NOTE — PROGRESS NOTES
"  SUBJECTIVE:   J oAnn Mckeon is a 38 year old female who presents to clinic today for the following health issues:      ED/UC Followup:    Facility:  [facility]  Date of visit: 12/29/18  Reason for visit: fall, abdominal pain, shortness of breath  Current Status: better, but still sore with bending, wants to talk the CT results.      Fell holding one year old down carpeted stairs in her home on Dec 29th.  Went to [facility] ED for eval.  Had CT scan showing fractures of lumbar transverse processes.  Back is healing ok.  But she is concerned over a pulmonary nodule seen incidentally on the CT scan.  It was 0.5mm in the RLL.      Problem list and histories reviewed & adjusted, as indicated.  Additional history: as documented      Reviewed and updated as needed this visit by clinical staff  Tobacco  Allergies  Meds  Med Hx  Surg Hx  Fam Hx  Soc Hx      Reviewed and updated as needed this visit by Provider         ROS:  Constitutional, HEENT, cardiovascular, pulmonary, gi and gu systems are negative, except as otherwise noted.    OBJECTIVE:     /70 (BP Location: Right arm, Patient Position: Sitting, Cuff Size: Adult Regular)   Pulse 84   Temp 98.7  F (37.1  C) (Oral)   Resp 16   Ht 1.676 m (5' 6\")   Wt 76.2 kg (168 lb)   LMP 12/14/2018 (Approximate)   SpO2 100%   BMI 27.12 kg/m    Body mass index is 27.12 kg/m .  GENERAL: healthy, alert and no distress  MS: no gross musculoskeletal defects noted, no edema  SKIN: no suspicious lesions or rashes  PSYCH: mentation appears normal, affect normal/bright    Diagnostic Test Results:  none     ASSESSMENT/PLAN:   1. Closed fracture of transverse process of lumbar vertebra with routine healing, subsequent encounter  She is feeling better already.  Continue to manage with tylenol/ibuprofen and follow up as needed.    2. Pulmonary nodules  From Up to Date: With a <6 mm solid nodule, further follow-up is not typically required; for a patient with a " relevant risk factor for cancer (eg, smoking history), a CT at 12 months is optional, bearing in mind that cancer risk is considerably less than 1 percent even in patients at high risk.    Discussed that she is a very low risk with this nodule.  Likely will not even need repeat but we can revisit in one year.        Gómez Hoskins PA-C  [facility]

## 2020-11-20 NOTE — ED NOTES
Istat Hcg <5  
PT ambulated to restroom with slow gait. Complaints of LT sided lower back pain.  
Not Applicable

## 2022-01-09 ENCOUNTER — HOSPITAL ENCOUNTER (EMERGENCY)
Facility: CLINIC | Age: 42
Discharge: HOME | End: 2022-01-09
Attending: EMERGENCY MEDICINE | Admitting: EMERGENCY MEDICINE
Payer: OTHER GOVERNMENT

## 2022-01-09 ENCOUNTER — APPOINTMENT (OUTPATIENT)
Dept: GENERAL RADIOLOGY | Facility: CLINIC | Age: 42
End: 2022-01-09
Attending: EMERGENCY MEDICINE
Payer: OTHER GOVERNMENT

## 2022-01-09 VITALS
HEART RATE: 82 BPM | OXYGEN SATURATION: 100 % | DIASTOLIC BLOOD PRESSURE: 76 MMHG | RESPIRATION RATE: 16 BRPM | SYSTOLIC BLOOD PRESSURE: 155 MMHG | TEMPERATURE: 98.7 F

## 2022-01-09 DIAGNOSIS — U07.1 INFECTION DUE TO 2019 NOVEL CORONAVIRUS: ICD-10-CM

## 2022-01-09 LAB
FLUAV RNA SPEC QL NAA+PROBE: NEGATIVE
FLUBV RNA RESP QL NAA+PROBE: NEGATIVE
SARS-COV-2 RNA RESP QL NAA+PROBE: POSITIVE

## 2022-01-09 PROCEDURE — C9803 HOPD COVID-19 SPEC COLLECT: HCPCS

## 2022-01-09 PROCEDURE — 99285 EMERGENCY DEPT VISIT HI MDM: CPT | Mod: 25

## 2022-01-09 PROCEDURE — 87636 SARSCOV2 & INF A&B AMP PRB: CPT | Performed by: EMERGENCY MEDICINE

## 2022-01-09 PROCEDURE — 93005 ELECTROCARDIOGRAM TRACING: CPT

## 2022-01-09 PROCEDURE — 71045 X-RAY EXAM CHEST 1 VIEW: CPT

## 2022-01-09 ASSESSMENT — ENCOUNTER SYMPTOMS
RHINORRHEA: 1
FEVER: 0
VOICE CHANGE: 1
BACK PAIN: 1
FATIGUE: 1
COUGH: 1

## 2022-01-09 NOTE — ED PROVIDER NOTES
History   Chief Complaint:  Cough       The history is provided by the patient.      Jo Ann Mckeon is a 41 year old female with history of type 2 diabetes mellitus who presents with a cough. For the past 3 days the patient has been experiencing a cough, rhinorrhea, and a horse voice. While coughing she notes right sided chest pain and mid lower back pain. The patient is additionally fatigued. Upon arrival to the ED she denies any fever. She is vaccinated for covid.     Review of Systems   Constitutional: Positive for fatigue. Negative for fever.   HENT: Positive for rhinorrhea and voice change.    Respiratory: Positive for cough.    Cardiovascular: Positive for chest pain.   Musculoskeletal: Positive for back pain.   All other systems reviewed and are negative.        Allergies:  No Known Allergies    Medications:  metformin   naproxen   Vitamin D    Past Medical History:     Gestational diabetes mellitus (GDM)    (normal spontaneous vaginal delivery)    Type 2 diabetes mellitus   Ectopic pregnancy    Past Surgical History:    Appendectomy      Family History:    No past pertinent family history.    Social History:  Presents unaccompanied.   PCP: Senthil Fountain Letha     Physical Exam     Patient Vitals for the past 24 hrs:   BP Temp Temp src Pulse Resp SpO2   22 1136 -- -- -- -- 16 --   22 1030 (!) 155/76 98.7  F (37.1  C) Oral 82 18 100 %       Physical Exam  Vitals: reviewed by me  General: Pt seen on \[facility] [place] [facility]\"", pleasant, cooperative, and alert to conversation  Eyes: Tracking well, clear conjunctiva BL  ENT: MMM, midline trachea.   Lungs: No tachypnea, no accessory muscle use. No respiratory distress.   CV: Rate as above  MSK: no joint effusion.  No evidence of trauma  Skin: No rash  Neuro: Clear speech and no facial droop.  Psych: Not RIS, no e/o AH/VH      Emergency Department Course   ECG  ECG obtained at 1105, ECG read at 1109  Normal sinus rhythm with sinus arrhythmia. Normal  ECG.    No prior ECG to compare.   Rate 71 bpm. MI interval 130 ms. QRS duration 88 ms. QT/QTc 370/402 ms. P-R-T axes 60 67 51.     Imaging:  XR Chest 1 View   Preliminary Result   IMPRESSION: Single PA view of the chest was obtained. Cardiomediastinal silhouette is within normal limits. No suspicious focal pulmonary opacities. No significant pleural effusion or pneumothorax.                Report per radiology    Laboratory:  Labs Ordered and Resulted from Time of ED Arrival to Time of ED Departure   INFLUENZA A/B & SARS-COV2 PCR MULTIPLEX - Abnormal       Result Value    Influenza A PCR Negative      Influenza B PCR Negative      SARS CoV2 PCR Positive (*)        Emergency Department Course:    Reviewed:  I reviewed nursing notes, vitals, past medical history and Care Everywhere    Assessments:  1109 I obtained history and examined the patient as noted above.    I rechecked the patient and explained findings.     Disposition:  The patient was discharged to home.     Impression & Plan     Medical Decision Making:  This is a pleasant 41-year-old vaccinated female presents emergency room with what appears to be a coronavirus infection.  This would certainly explain her hoarse voice, and URI symptoms, and the cough.  Her chest pain is likely due to the cough as well, and thankfully her EKG is within normal limits.  She knows to come back to the ER immediately with any worsening symptoms, if she develops any constant or exertional chest pain, or with any other concerns but right now does seem like her main issue is the Covid infection.  We will plan for discharge home with return ED precautions and primary care follow-up in the next week.    Diagnosis:    ICD-10-CM    1. Infection due to 2019 novel coronavirus  U07.1        Scribe Disclosure:  Georgette BORREGO, am serving as a scribe at 10:58 AM on 1/9/2022 to document services personally performed by Kadeem Amos MD based on my observations and the  provider's statements to me.            Kadeem Amos MD  01/09/22 9979

## 2022-01-09 NOTE — ED TRIAGE NOTES
Cough x3 days. C/o right sided chest pain and mid lower back pain from coughing. Fatigue. Afebrile.

## 2022-01-09 NOTE — DISCHARGE INSTRUCTIONS
As discussed, you tested positive for coronavirus today.  Please self isolate for 5 days per CDC guidelines.  Come back to the ER immediately with any other concerns, or if you become too short of breath to do normal activities like get dressed or feed yourself.  Come back with any other concerns you have and be sure to follow with your regular doctor in 1 week as well, even if it is just a virtual visit or phone call.  Come back with any other concerns

## 2022-01-10 LAB
ATRIAL RATE - MUSE: 71 BPM
DIASTOLIC BLOOD PRESSURE - MUSE: NORMAL MMHG
INTERPRETATION ECG - MUSE: NORMAL
P AXIS - MUSE: 60 DEGREES
PR INTERVAL - MUSE: 130 MS
QRS DURATION - MUSE: 88 MS
QT - MUSE: 370 MS
QTC - MUSE: 402 MS
R AXIS - MUSE: 67 DEGREES
SYSTOLIC BLOOD PRESSURE - MUSE: NORMAL MMHG
T AXIS - MUSE: 51 DEGREES
VENTRICULAR RATE- MUSE: 71 BPM

## 2022-03-09 ENCOUNTER — ANCILLARY PROCEDURE (OUTPATIENT)
Dept: GENERAL RADIOLOGY | Facility: CLINIC | Age: 42
End: 2022-03-09
Attending: FAMILY MEDICINE

## 2022-03-09 ENCOUNTER — OFFICE VISIT (OUTPATIENT)
Dept: URGENT CARE | Facility: URGENT CARE | Age: 42
End: 2022-03-09

## 2022-03-09 VITALS
TEMPERATURE: 99 F | HEART RATE: 84 BPM | WEIGHT: 178 LBS | HEIGHT: 68 IN | OXYGEN SATURATION: 97 % | RESPIRATION RATE: 16 BRPM | SYSTOLIC BLOOD PRESSURE: 118 MMHG | BODY MASS INDEX: 26.98 KG/M2 | DIASTOLIC BLOOD PRESSURE: 66 MMHG

## 2022-03-09 DIAGNOSIS — R05.9 COUGH: ICD-10-CM

## 2022-03-09 DIAGNOSIS — J10.1 INFLUENZA A: Primary | ICD-10-CM

## 2022-03-09 LAB
FLUAV AG SPEC QL IA: POSITIVE
FLUBV AG SPEC QL IA: NEGATIVE

## 2022-03-09 PROCEDURE — 87804 INFLUENZA ASSAY W/OPTIC: CPT | Performed by: FAMILY MEDICINE

## 2022-03-09 PROCEDURE — 99203 OFFICE O/P NEW LOW 30 MIN: CPT | Performed by: FAMILY MEDICINE

## 2022-03-09 PROCEDURE — 71046 X-RAY EXAM CHEST 2 VIEWS: CPT | Performed by: RADIOLOGY

## 2022-03-09 NOTE — PATIENT INSTRUCTIONS
Today your chest x-ray looks very good.  I don't see any changes from the last x-ray you had.  Everything looks normal.    Influenza test is positive for influenza A    Drink plenty of fluids, rest, and use Tylenol or ibuprofen as needed.

## 2022-03-09 NOTE — PROGRESS NOTES
"ASSESSMENT:    ICD-10-CM    1. Influenza A  J10.1    2. Cough  R05.9 Influenza A & B Antigen - Clinic Collect     XR Chest 2 Views     No evidence of pneumonia or any significant post-COVID lung changes.  Respiratory status is stable.    PLAN:  Patient Instructions   Today your chest x-ray looks very good.  I don't see any changes from the last x-ray you had.  Everything looks normal.    Influenza test is positive for influenza A    Drink plenty of fluids, rest, and use Tylenol or ibuprofen as needed.      SUBJECTIVE:  Jo Ann Mckeon is a 41 year old female who presents to  today with cough and chest tightness for a few days.  +influenza exposure.  Had COVID 2 months ago and feels like she fully recovered from that and actually feels worse now than she did with COVID.  Having occasional chest pain with coughing and sometimes back pain.  Had headache a few days ago, but this has improved.  No palpitations.  No fevers.  No rashes.  Cough is somewhat productive.    Pt says that she was told there was \"something on a chest xray\" a few years ago, but no other details known.  Records reviewed--last CXR from 2 months ago when she was diagnosed with COVID was read as normal.    OBJECTIVE:  /66 (BP Location: Right arm, Patient Position: Chair, Cuff Size: Adult Regular)   Pulse 84   Temp 99  F (37.2  C) (Oral)   Resp 16   Ht 1.727 m (5' 8\")   Wt 80.7 kg (178 lb)   LMP  (LMP Unknown)   SpO2 97%   Breastfeeding No   BMI 27.06 kg/m    GEN: non-toxic but mildly ill-appearing, in NAD  ENT: TMs normal, oral MMM, normal pharynx  Lungs:  CTAB  CV:  RRR, no murmurs    On my reading today's CXR does not show any change from the normal CXR done on 1/9/22.    Results for orders placed or performed in visit on 03/09/22   Influenza A & B Antigen - Clinic Collect     Status: Abnormal    Specimen: Nasopharyngeal; Swab   Result Value Ref Range    Influenza A antigen Positive (A) Negative    Influenza B antigen Negative " Negative    Narrative    Test results must be correlated with clinical data. If necessary, results should be confirmed by a molecular assay or viral culture.

## 2022-05-04 NOTE — PROGRESS NOTES
Pre-Visit Planning     Appointment Notes for this encounter:   Remove nexplanon    Questionnaires Reviewed/Assigned  No additional questionnaires are needed    Patient preferred phone number: [phone]    Unable to reach. Left voicemail. Advised patient to call clinic back at [phone].

## 2022-05-05 ENCOUNTER — OFFICE VISIT (OUTPATIENT)
Dept: FAMILY MEDICINE | Facility: CLINIC | Age: 42
End: 2022-05-05

## 2022-05-05 VITALS
BODY MASS INDEX: 30.1 KG/M2 | TEMPERATURE: 98.2 F | OXYGEN SATURATION: 100 % | HEIGHT: 65 IN | DIASTOLIC BLOOD PRESSURE: 64 MMHG | HEART RATE: 82 BPM | WEIGHT: 180.7 LBS | RESPIRATION RATE: 16 BRPM | SYSTOLIC BLOOD PRESSURE: 114 MMHG

## 2022-05-05 DIAGNOSIS — Z30.46 NEXPLANON REMOVAL: Primary | ICD-10-CM

## 2022-05-05 PROCEDURE — 11982 REMOVE DRUG IMPLANT DEVICE: CPT | Performed by: PHYSICIAN ASSISTANT

## 2022-05-05 NOTE — PROGRESS NOTES
Nexplanon Removal:     Is a pregnancy test required: No.  Was a consent obtained?  Yes    Jo Ann Mckeon is here for removal of etonogestrel implant Nexplanon/Implanon    Indication: Removal nexplanon, placed in 2017 but did not have insurance when it was time to remove.      Preoperative Diagnosis: etonogestrel implant  Postoperative Diagnosis: etonogestrel implant removed    Technique: On the left arm  Skin prep Betadine  Anesthesia 1% lidocaine, with epi  Procedure: Small incision (<5mm) was made at distal end of palpable implant, curved hemostat or mosquito forceps was used to isolate the implant and bring it to the incision, the fibrous capsule containing the implant  was incised and the Implant was removed intact.      EBL: minimal  Complications:  No  Tolerance:  Pt tolerated procedure well and was in stable condition.   Dressing:    A pressure bandage was placed for the next 12-24 hours.    Contraception was discussed and patient chose the following method none      Follow up: Pt was instructed to call if bleeding, severe pain or foul smell.     Hamida Shore PA-C

## 2023-06-27 ENCOUNTER — APPOINTMENT (OUTPATIENT)
Dept: ULTRASOUND IMAGING | Facility: CLINIC | Age: 43
End: 2023-06-27
Attending: EMERGENCY MEDICINE

## 2023-06-27 ENCOUNTER — APPOINTMENT (OUTPATIENT)
Dept: CT IMAGING | Facility: CLINIC | Age: 43
End: 2023-06-27
Attending: EMERGENCY MEDICINE

## 2023-06-27 ENCOUNTER — HOSPITAL ENCOUNTER (EMERGENCY)
Facility: CLINIC | Age: 43
Discharge: HOME | End: 2023-06-28
Attending: EMERGENCY MEDICINE | Admitting: EMERGENCY MEDICINE

## 2023-06-27 ENCOUNTER — APPOINTMENT (OUTPATIENT)
Dept: GENERAL RADIOLOGY | Facility: CLINIC | Age: 43
End: 2023-06-27
Attending: EMERGENCY MEDICINE

## 2023-06-27 DIAGNOSIS — R91.8 PULMONARY NODULES: ICD-10-CM

## 2023-06-27 DIAGNOSIS — R10.9 BILATERAL FLANK PAIN: ICD-10-CM

## 2023-06-27 DIAGNOSIS — M25.561 ACUTE PAIN OF RIGHT KNEE: ICD-10-CM

## 2023-06-27 DIAGNOSIS — R07.9 ACUTE CHEST PAIN: ICD-10-CM

## 2023-06-27 LAB
ALBUMIN SERPL BCG-MCNC: 4.2 G/DL (ref 3.5–5.2)
ALBUMIN UR-MCNC: NEGATIVE MG/DL
ALP SERPL-CCNC: 98 U/L (ref 35–104)
ALT SERPL W P-5'-P-CCNC: 33 U/L (ref 0–50)
ANION GAP SERPL CALCULATED.3IONS-SCNC: 9 MMOL/L (ref 7–15)
APPEARANCE UR: CLEAR
AST SERPL W P-5'-P-CCNC: 24 U/L (ref 0–45)
BASOPHILS # BLD AUTO: 0 10E3/UL (ref 0–0.2)
BASOPHILS NFR BLD AUTO: 0 %
BILIRUB SERPL-MCNC: 0.2 MG/DL
BILIRUB UR QL STRIP: NEGATIVE
BUN SERPL-MCNC: 12.2 MG/DL (ref 6–20)
CALCIUM SERPL-MCNC: 8.8 MG/DL (ref 8.6–10)
CHLORIDE SERPL-SCNC: 103 MMOL/L (ref 98–107)
COLOR UR AUTO: ABNORMAL
CREAT SERPL-MCNC: 0.64 MG/DL (ref 0.51–0.95)
D DIMER PPP FEU-MCNC: <0.27 UG/ML FEU (ref 0–0.5)
DEPRECATED HCO3 PLAS-SCNC: 24 MMOL/L (ref 22–29)
EOSINOPHIL # BLD AUTO: 0.1 10E3/UL (ref 0–0.7)
EOSINOPHIL NFR BLD AUTO: 1 %
ERYTHROCYTE [DISTWIDTH] IN BLOOD BY AUTOMATED COUNT: 12.3 % (ref 10–15)
GFR SERPL CREATININE-BSD FRML MDRD: >90 ML/MIN/1.73M2
GLUCOSE SERPL-MCNC: 163 MG/DL (ref 70–99)
GLUCOSE UR STRIP-MCNC: NEGATIVE MG/DL
HCG UR QL: NEGATIVE
HCT VFR BLD AUTO: 37.7 % (ref 35–47)
HGB BLD-MCNC: 12.7 G/DL (ref 11.7–15.7)
HGB UR QL STRIP: NEGATIVE
HOLD SPECIMEN: NORMAL
HOLD SPECIMEN: NORMAL
IMM GRANULOCYTES # BLD: 0 10E3/UL
IMM GRANULOCYTES NFR BLD: 0 %
KETONES UR STRIP-MCNC: NEGATIVE MG/DL
LEUKOCYTE ESTERASE UR QL STRIP: NEGATIVE
LYMPHOCYTES # BLD AUTO: 2.2 10E3/UL (ref 0.8–5.3)
LYMPHOCYTES NFR BLD AUTO: 24 %
MCH RBC QN AUTO: 28.5 PG (ref 26.5–33)
MCHC RBC AUTO-ENTMCNC: 33.7 G/DL (ref 31.5–36.5)
MCV RBC AUTO: 85 FL (ref 78–100)
MONOCYTES # BLD AUTO: 0.5 10E3/UL (ref 0–1.3)
MONOCYTES NFR BLD AUTO: 5 %
MUCOUS THREADS #/AREA URNS LPF: PRESENT /LPF
NEUTROPHILS # BLD AUTO: 6.4 10E3/UL (ref 1.6–8.3)
NEUTROPHILS NFR BLD AUTO: 70 %
NITRATE UR QL: NEGATIVE
NRBC # BLD AUTO: 0 10E3/UL
NRBC BLD AUTO-RTO: 0 /100
PH UR STRIP: 6.5 [PH] (ref 5–7)
PLATELET # BLD AUTO: 221 10E3/UL (ref 150–450)
POTASSIUM SERPL-SCNC: 3.8 MMOL/L (ref 3.4–5.3)
PROT SERPL-MCNC: 7.1 G/DL (ref 6.4–8.3)
RBC # BLD AUTO: 4.46 10E6/UL (ref 3.8–5.2)
RBC URINE: 1 /HPF
SODIUM SERPL-SCNC: 136 MMOL/L (ref 136–145)
SP GR UR STRIP: 1.03 (ref 1–1.03)
SQUAMOUS EPITHELIAL: 4 /HPF
TROPONIN T SERPL HS-MCNC: <6 NG/L
UROBILINOGEN UR STRIP-MCNC: NORMAL MG/DL
WBC # BLD AUTO: 9.2 10E3/UL (ref 4–11)
WBC URINE: 1 /HPF

## 2023-06-27 PROCEDURE — 99285 EMERGENCY DEPT VISIT HI MDM: CPT | Mod: 25

## 2023-06-27 PROCEDURE — 81001 URINALYSIS AUTO W/SCOPE: CPT | Performed by: EMERGENCY MEDICINE

## 2023-06-27 PROCEDURE — 85379 FIBRIN DEGRADATION QUANT: CPT | Performed by: EMERGENCY MEDICINE

## 2023-06-27 PROCEDURE — 93005 ELECTROCARDIOGRAM TRACING: CPT

## 2023-06-27 PROCEDURE — 84484 ASSAY OF TROPONIN QUANT: CPT | Performed by: EMERGENCY MEDICINE

## 2023-06-27 PROCEDURE — 71046 X-RAY EXAM CHEST 2 VIEWS: CPT

## 2023-06-27 PROCEDURE — 96374 THER/PROPH/DIAG INJ IV PUSH: CPT | Mod: 59

## 2023-06-27 PROCEDURE — 85025 COMPLETE CBC W/AUTO DIFF WBC: CPT | Performed by: EMERGENCY MEDICINE

## 2023-06-27 PROCEDURE — 81025 URINE PREGNANCY TEST: CPT | Performed by: EMERGENCY MEDICINE

## 2023-06-27 PROCEDURE — 36415 COLL VENOUS BLD VENIPUNCTURE: CPT | Performed by: EMERGENCY MEDICINE

## 2023-06-27 PROCEDURE — 250N000011 HC RX IP 250 OP 636: Mod: JZ | Performed by: EMERGENCY MEDICINE

## 2023-06-27 PROCEDURE — 80053 COMPREHEN METABOLIC PANEL: CPT | Performed by: EMERGENCY MEDICINE

## 2023-06-27 PROCEDURE — 250N000011 HC RX IP 250 OP 636: Performed by: EMERGENCY MEDICINE

## 2023-06-27 PROCEDURE — 93971 EXTREMITY STUDY: CPT | Mod: RT

## 2023-06-27 PROCEDURE — 74177 CT ABD & PELVIS W/CONTRAST: CPT

## 2023-06-27 RX ORDER — KETOROLAC TROMETHAMINE 15 MG/ML
15 INJECTION, SOLUTION INTRAMUSCULAR; INTRAVENOUS ONCE
Status: COMPLETED | OUTPATIENT
Start: 2023-06-27 | End: 2023-06-27

## 2023-06-27 RX ORDER — IOPAMIDOL 755 MG/ML
500 INJECTION, SOLUTION INTRAVASCULAR ONCE
Status: COMPLETED | OUTPATIENT
Start: 2023-06-27 | End: 2023-06-27

## 2023-06-27 RX ADMIN — KETOROLAC TROMETHAMINE 15 MG: 15 INJECTION, SOLUTION INTRAMUSCULAR; INTRAVENOUS at 23:58

## 2023-06-27 RX ADMIN — IOPAMIDOL 95 ML: 755 INJECTION, SOLUTION INTRAVENOUS at 23:35

## 2023-06-27 ASSESSMENT — ACTIVITIES OF DAILY LIVING (ADL): ADLS_ACUITY_SCORE: 35

## 2023-06-28 VITALS
WEIGHT: 189 LBS | BODY MASS INDEX: 30.37 KG/M2 | OXYGEN SATURATION: 100 % | DIASTOLIC BLOOD PRESSURE: 66 MMHG | TEMPERATURE: 98.8 F | HEART RATE: 68 BPM | RESPIRATION RATE: 20 BRPM | SYSTOLIC BLOOD PRESSURE: 131 MMHG | HEIGHT: 66 IN

## 2023-06-28 LAB
ATRIAL RATE - MUSE: 63 BPM
DIASTOLIC BLOOD PRESSURE - MUSE: NORMAL MMHG
INTERPRETATION ECG - MUSE: NORMAL
P AXIS - MUSE: 63 DEGREES
PR INTERVAL - MUSE: 140 MS
QRS DURATION - MUSE: 88 MS
QT - MUSE: 398 MS
QTC - MUSE: 407 MS
R AXIS - MUSE: 60 DEGREES
SYSTOLIC BLOOD PRESSURE - MUSE: NORMAL MMHG
T AXIS - MUSE: 45 DEGREES
VENTRICULAR RATE- MUSE: 63 BPM

## 2023-06-28 NOTE — ED PROVIDER NOTES
"  History     Chief Complaint:  Abdominal Pain     The history is provided by the patient.      Jo Ann Mckeon is a 43 year old female with history of type 2 diabetes and ectopic pregnancy who presents to the ED via car with her mother for evaluation of abdominal pain. Patient endorses left-sided abdominal pain that occasionally radiates to her right side. She also endorses abdominal bloating. She notes she has also had midsternal chest pain for the past week radiating under her right breast. Patient also endorses pain behind her right knee and her right-sided neck.  Pain to the right side of the neck is present only when she turns her head to the far left.  No trauma, falls or recent injury.  No radiation of pain down arms.  No balance or gait disturbance, no visual changes, or other associated neurologic symptoms. She states she has no alleviating or exacerbating factors for the chest pain.  Specifically, she denies any association with exertion.  No vomiting, diarrhea, or fever reported. She reports use of Tylenol at home with little relief. She reports no other abdominal surgeries besides an appendectomy as a young child. She denies personal history of heart problems or blood clots.     Independent Historian:   None - Patient Only    Review of External Notes:   Office visit note from 5/5/22 when she had her Nexplanon removed      Medications:    Metformin     Past Medical History:    Insulin controlled gestational diabetes mellitus   Type 2 diabetes   Ectopic pregnancy     Past Surgical History:    Appendectomy     Physical Exam     Patient Vitals for the past 24 hrs:   BP Temp Temp src Pulse Resp SpO2 Height Weight   06/27/23 2120 (!) 143/82 -- -- 76 -- 100 % -- --   06/27/23 2057 (!) 158/85 98.8  F (37.1  C) Oral 86 20 100 % 1.676 m (5' 6\") 85.7 kg (189 lb)      Physical Exam    General:              Well-nourished              Speaking in full sentences  Eyes:              Conjunctiva without injection or " scleral icterus  ENT:              Moist mucous membranes              Nares patent              Pinnae normal  Neck:              Full ROM              No stiffness appreciated   Tenderness to palpation to left cervical paraspinal musculature   No overlying skin changes  Resp:              Lungs CTAB              No crackles, wheezing or audible rubs              Good air movement  CV:                    Normal rate, regular rhythm              S1 and S2 present              No murmur, gallop or rub  GI:              BS present              Abdomen soft without distention              Mild LLQ tenderness              No guarding or rebound tenderness  Skin:              Warm, dry, well perfused              No rashes or open wounds on exposed skin  MSK:              Moves all extremities              No focal deformities or swelling              Mild tenderness posterior to right knee  Neuro:              Alert              Answers questions appropriately              Moves all extremities equally              Gait stable  Psych:              Normal affect, normal mood    Emergency Department Course   ECG  ECG results from 06/27/23   EKG 12-lead, tracing only     Value    Systolic Blood Pressure     Diastolic Blood Pressure     Ventricular Rate 63    Atrial Rate 63    AR Interval 140    QRS Duration 88        QTc 407    P Axis 63    R AXIS 60    T Axis 45    Interpretation ECG      Sinus rhythm  Normal ECG  When compared with ECG of 09-JAN-2022 11:05,  No significant change was found        Imaging:  Chest XR,  PA & LAT   Final Result   IMPRESSION: Heart size is upper limits of normal. Lungs are clear of CHF, lobar consolidation or effusion. No pneumothorax. No acute bony abnormality.      US Lower Extremity Venous Duplex Right   Final Result   IMPRESSION:   1.  No deep venous thrombosis in the right lower extremity.      CT Abdomen Pelvis w Contrast   Final Result   IMPRESSION:    1.  No renal calculi or  hydronephrosis.   2.  No findings to account for the patient's left flank and left lower quadrant pain.      REFERENCE:   Guidelines for Management of Incidental Pulmonary Nodules Detected on CT Images: From the Fleischner Society 2017.    Guidelines apply to incidental nodules in patients who are 35 years or older.   Guidelines do not apply to lung cancer screening, patients with immunosuppression, or patients with known primary cancer.      MULTIPLE NODULES   Nodule size <6 mm   Low-risk patients: No follow-up needed.   High-risk patients: Optional follow-up at 12 months.      Nodule size 6 mm or larger   Low-risk patients: Follow-up CT at 3-6 months, then consider CT at 18-24 months.   High-risk patients: Follow-up CT at 3-6 months, then at 18-24 months if no change.   -Use most suspicious nodule as guide to management.      Consider referral to lung nodule clinic.               Report per radiology    Laboratory:  Labs Ordered and Resulted from Time of ED Arrival to Time of ED Departure   COMPREHENSIVE METABOLIC PANEL - Abnormal       Result Value    Sodium 136      Potassium 3.8      Chloride 103      Carbon Dioxide (CO2) 24      Anion Gap 9      Urea Nitrogen 12.2      Creatinine 0.64      Calcium 8.8      Glucose 163 (*)     Alkaline Phosphatase 98      AST 24      ALT 33      Protein Total 7.1      Albumin 4.2      Bilirubin Total 0.2      GFR Estimate >90     ROUTINE UA WITH MICROSCOPIC - Abnormal    Color Urine Light Yellow      Appearance Urine Clear      Glucose Urine Negative      Bilirubin Urine Negative      Ketones Urine Negative      Specific Gravity Urine 1.027      Blood Urine Negative      pH Urine 6.5      Protein Albumin Urine Negative      Urobilinogen Urine Normal      Nitrite Urine Negative      Leukocyte Esterase Urine Negative      Mucus Urine Present (*)     RBC Urine 1      WBC Urine 1      Squamous Epithelials Urine 4 (*)    HCG QUALITATIVE URINE - Normal    hCG Urine Qualitative  Negative     D DIMER QUANTITATIVE - Normal    D-Dimer Quantitative <0.27     TROPONIN T, HIGH SENSITIVITY - Normal    Troponin T, High Sensitivity <6     CBC WITH PLATELETS AND DIFFERENTIAL    WBC Count 9.2      RBC Count 4.46      Hemoglobin 12.7      Hematocrit 37.7      MCV 85      MCH 28.5      MCHC 33.7      RDW 12.3      Platelet Count 221      % Neutrophils 70      % Lymphocytes 24      % Monocytes 5      % Eosinophils 1      % Basophils 0      % Immature Granulocytes 0      NRBCs per 100 WBC 0      Absolute Neutrophils 6.4      Absolute Lymphocytes 2.2      Absolute Monocytes 0.5      Absolute Eosinophils 0.1      Absolute Basophils 0.0      Absolute Immature Granulocytes 0.0      Absolute NRBCs 0.0        Emergency Department Course & Assessments:     Interventions:  Medications   ketorolac (TORADOL) injection 15 mg (15 mg Intravenous $Given 6/27/23 5239)   iopamidol (ISOVUE-370) solution 500 mL (95 mLs Intravenous $Given 6/27/23 2335)   sodium chloride (PF) 0.9% PF flush 100 mL (65 mLs Intravenous $Given 6/27/23 2334)      Independent Interpretation (X-rays, CTs, rhythm strip):  None    Assessments/Consultations/Discussion of Management or Tests:  ED Course as of 06/28/23 0032   Tue Jun 27, 2023 2219 I obtained history and examined the patient as noted above.    Wed Jun 28, 2023 0014 I rechecked the patient and explained findings.      Social Determinants of Health affecting care:   None    Disposition:  The patient was discharged to home.     Impression & Plan      Medical Decision Making:  Jo Ann Mckeon is a very pleasant 43-year-old female presenting to the ER accompanied by mother for evaluation of various concerns including flank pain/abdominal pain, right posterior knee pain, chest discomfort, and neck discomfort as noted above.  Broad differential diagnosis was considered regarding the patient's concerns.  Work-up included advanced imaging, and laboratory studies.  With regards to patient's  abdominal discomfort, she exhibits primarily left-sided discomfort with occasional radiation to the right side.  Advanced imaging was pursued.  Fortunately no evidence of acute intra-abdominal pathology is noted.  Specifically, no evidence of bowel obstruction, inflammatory changes, perforation, ureteral tract obstruction, or other acute process.  On initial repeat evaluation, no peritoneal findings were appreciated.  No overlying skin changes are noted to suggest zoster.  UA is not suggestive of UTI nor pyelonephritis.  Pregnancy test returned negative.  No adnexal mass or other abnormality noted that would raise suspicion for ovarian torsion or other acute ovarian pathology.  Patient informed of incidentally noted pulmonary nodules, which she mentions that she had previously been told of.  I recommended ongoing surveillance with PCP which she verbalized understanding of.  With regards to patient's right posterior knee pain, venous ultrasound obtained.  This returned negative for DVT, Baker's cyst, or other acute process.  Normal D-dimer further argues against DVT.  Extremity otherwise warm and well-perfused with out signs of impaired arterial flow.  No history of trauma or fall to suggest acute bony abnormality.  No overlying warmth, erythema, nor associated constitutional symptoms that would raise suspicion for septic or inflammatory arthropathy.  With regards to chest discomfort, precise etiology not entirely clear.  She does describe atypical features and that pain is primarily right-sided, intermittent, and not associated with exertion.  EKG obtained demonstrating sinus rhythm without findings of acute ischemia.  High-sensitivity troponin has returned undetectable and given duration of symptoms for the past 1 week, feel ACS to be quite unlikely.  Pulmonary embolism also unlikely as patient is low risk by Wells criteria, and D-dimer returned undetectable.  Chest x-ray reveals heart limits at the upper limits of  normal, though no other findings of acute infiltrate, or other acute process.  With regards to neck discomfort, she exhibits reproducible tenderness to palpation to the cervical paraspinal musculature.  She has not sustained any traumatic injuries nor falls.  At rest, this pain is resolved, and present only with extreme rotation of the head to the left.  This raises suspicion for musculoskeletal etiologies.  She denies any associated neurologic symptoms including visual changes, gait imbalance, or vertigo.  As such I feel this is unlikely to represent vascular dissection or posterior circulation disease such as CVA/TIA.  Acute aortic dissection also felt to be an unlikely cause for patient's above presenting symptoms given the various complaints.  Patient provided above analgesia, noting improvement in symptoms.  Results and clinical impression discussed with patient.  With reasonable clinical certainty I feel she can safely be discharged from the ED with supportive outpatient treatment and close follow-up.  We discussed her elevated blood sugar on laboratory evaluation, though this is a nonfasting sample.  I recommended repeat testing with PCP as an outpatient for a fasting sample to further evaluate possible underlying diabetes.  She verbalized understanding of this.  She is welcome to return to the ED with any new or troubling symptoms such as worsening pain, or any other concerns.  All questions answered prior to discharge.    Diagnosis:    ICD-10-CM    1. Bilateral flank pain  R10.9       2. Pulmonary nodules  R91.8       3. Acute chest pain  R07.9       4. Acute pain of right knee  M25.561            Scribe Disclosure:  I, Brittani Hawkins, am serving as a scribe at 10:15 PM on 6/27/2023 to document services personally performed by Beau Glass MD based on my observations and the provider's statements to me.     6/27/2023   Beau Glass MD Roach, Brian Donald, MD  06/28/23 9275

## 2023-06-28 NOTE — ED TRIAGE NOTES
Pt arrives to the ED due to having bilateral side pain that has been present x 1 wk. Pt also c/o pain behind right knee and pain to right thumb. Pt also states right side of neck hurts. Denies nausea/vomiting or diarrhea.

## 2023-06-28 NOTE — DISCHARGE INSTRUCTIONS
Please follow-up with your primary care provider in 1 to 2 days for recheck.  You can discuss need for ongoing monitoring of your pulmonary nodules.    You may use Tylenol or ibuprofen as needed for pain or discomfort.    Return to the ER if you develop worsening pain, fevers, vomiting, or any other new or troubling symptoms.

## 2023-10-16 ENCOUNTER — OFFICE VISIT (OUTPATIENT)
Dept: URGENT CARE | Facility: URGENT CARE | Age: 43
End: 2023-10-16

## 2023-10-16 VITALS
RESPIRATION RATE: 14 BRPM | BODY MASS INDEX: 31.15 KG/M2 | HEART RATE: 80 BPM | WEIGHT: 193 LBS | OXYGEN SATURATION: 99 % | DIASTOLIC BLOOD PRESSURE: 62 MMHG | TEMPERATURE: 98 F | SYSTOLIC BLOOD PRESSURE: 118 MMHG

## 2023-10-16 DIAGNOSIS — Z32.01 POSITIVE PREGNANCY TEST: Primary | ICD-10-CM

## 2023-10-16 LAB — HCG UR QL: POSITIVE

## 2023-10-16 PROCEDURE — 99212 OFFICE O/P EST SF 10 MIN: CPT | Performed by: PHYSICIAN ASSISTANT

## 2023-10-16 PROCEDURE — 81025 URINE PREGNANCY TEST: CPT

## 2023-10-16 NOTE — PATIENT INSTRUCTIONS
Patient is positive for pregnancy. Conservative measures discussed including avoiding ibuprofen. See your primary care provider if symptoms do not improve in 2 weeks. Seek emergency care if you develop pelvic pain or vaginal bleeding.

## 2023-10-16 NOTE — PROGRESS NOTES
URGENT CARE VISIT:    SUBJECTIVE:   Jo Ann Mckeon is a 43 year old female who presents for possible pregnancy. LMP somewhere around early September. Has associated breast tenderness. No treatments tried.     PMH:   Past Medical History:   Diagnosis Date    Diabetes (H)     Gestational     Allergies: Patient has no known allergies.  Medications:   Current Outpatient Medications   Medication Sig Dispense Refill    VITAMIN D, CHOLECALCIFEROL, PO Take by mouth daily       Social History:   Social History     Tobacco Use    Smoking status: Never    Smokeless tobacco: Never   Substance Use Topics    Alcohol use: No       ROS: ROS otherwise found to be negative except as noted above.     OBJECTIVE:  /62 (BP Location: Right arm, Patient Position: Chair, Cuff Size: Adult Regular)   Pulse 80   Temp 98  F (36.7  C) (Oral)   Resp 14   Wt 87.5 kg (193 lb)   LMP 09/06/2023 (Approximate)   SpO2 99%   BMI 31.15 kg/m    General: WDWN in NAD  Eyes: EOMI,  PERRL, conjunctiva clear  Neck: Supple, non-tender  Cardiac: RRR without murmurs, rubs, or gallops.  Respiratory: LCTAB without adventitious sounds. Non-labored breathing.  Neuro: Normal strength and tone, sensory exam grossly normal,  normal speech and mentation  Integumentary: No suspicious rashes or lesions.       ASSESSMENT:     ICD-10-CM    1. Positive pregnancy test  Z32.01 HCG qualitative urine     HCG qualitative urine           PLAN:  Patient Instructions   Patient is positive for pregnancy. Conservative measures discussed including avoiding ibuprofen. See your primary care provider to establish OBGYN. Seek emergency care if you develop pelvic pain or vaginal bleeding. Patient verbalized understanding and is agreeable to plan. The patient was discharged ambulatory and in stable condition.    Kathie Antunez PA-C ....................  10/16/2023   2:03 PM

## 2023-11-12 ENCOUNTER — PRE VISIT (OUTPATIENT)
Dept: ENDOCRINOLOGY | Facility: CLINIC | Age: 43
End: 2023-11-12

## 2023-11-16 ENCOUNTER — VIRTUAL VISIT (OUTPATIENT)
Dept: OBGYN | Facility: CLINIC | Age: 43
End: 2023-11-16

## 2023-11-16 DIAGNOSIS — O09.529 HIGH-RISK PREGNANCY, ELDERLY MULTIGRAVIDA, UNSPECIFIED TRIMESTER: Primary | ICD-10-CM

## 2023-11-16 LAB
ABO/RH(D): NORMAL
ANTIBODY SCREEN: NEGATIVE
SPECIMEN EXPIRATION DATE: NORMAL

## 2023-11-16 PROCEDURE — 99207 PR NO CHARGE NURSE ONLY: CPT

## 2023-11-16 RX ORDER — PRENATAL VIT/IRON FUM/FOLIC AC 27MG-0.8MG
1 TABLET ORAL DAILY
Start: 2023-11-16 | End: 2024-07-29

## 2023-11-16 NOTE — PROGRESS NOTES
"Chief Complaint   Patient presents with    Prenatal Care     New Prenatal Nurse Telephone Visit   9w4d  Estimated Date of Delivery: 2024      Initial LMP 09/10/2023 (Approximate)  Estimated body mass index is 31.15 kg/m  as calculated from the following:    Height as of 23: 1.676 m (5' 6\").    Weight as of 10/16/23: 87.5 kg (193 lb).  BP completed using cuff size: NA (Not Taken)    Questioned patient about current smoking habits.  Pt. has never smoked.          The following HM Due: pap smear     NPN nurse visit done over the phone. Pt will be given NPN folder and book at her upcoming appt.   Discussed optional screening available to assess chromosomal anomalies. Questions answered. Pt advised to call the clinic if she has any questions or concerns related to her pregnancy. Prenatal labs will be obtained at her upcoming appt. New prenatal visit scheduled on 23 with Dr Knowles.      Last pap: 16?        Patient supplied answers from flow sheet for:  Prenatal OB Questionnaire.  Past Medical History  Have you ever recieved care for your mental health? : No  Have you ever been in a major accident or suffered serious trauma?: No  Within the last year, has anyone hit, slapped, kicked or otherwise hurt you?: No  In the last year, has anyone forced you to have sex when you didn't want to?: No    Past Medical History 2   Have you ever received a blood transfusion?: No  Would you accept a blood transfusion if was medically recommended?: Yes  Does anyone in your home smoke?: No   Is your blood type Rh negative?: No  Have you ever ?: (!) Yes  Have you been hospitalized for a nonsurgical reason excluding normal delivery?: No  Have you ever had an abnormal pap smear?: No    Past Medical History (Continued)  Do you have a history of abnormalities of the uterus?: No  Did your mother take CIARA or any other hormones when she was pregnant with you?: Unknown  Do you have any other problems we have " not asked about which you feel may be important to this pregnancy?: No     Tequila Cano RN

## 2023-11-17 ENCOUNTER — ANCILLARY PROCEDURE (OUTPATIENT)
Dept: ULTRASOUND IMAGING | Facility: CLINIC | Age: 43
End: 2023-11-17

## 2023-11-17 ENCOUNTER — LAB (OUTPATIENT)
Dept: LAB | Facility: CLINIC | Age: 43
End: 2023-11-17

## 2023-11-17 ENCOUNTER — TELEPHONE (OUTPATIENT)
Dept: ENDOCRINOLOGY | Facility: CLINIC | Age: 43
End: 2023-11-17

## 2023-11-17 ENCOUNTER — TELEPHONE (OUTPATIENT)
Dept: EDUCATION SERVICES | Facility: CLINIC | Age: 43
End: 2023-11-17

## 2023-11-17 DIAGNOSIS — O09.529 HIGH-RISK PREGNANCY, ELDERLY MULTIGRAVIDA, UNSPECIFIED TRIMESTER: Primary | ICD-10-CM

## 2023-11-17 DIAGNOSIS — O09.529 HIGH-RISK PREGNANCY, ELDERLY MULTIGRAVIDA, UNSPECIFIED TRIMESTER: ICD-10-CM

## 2023-11-17 DIAGNOSIS — E11.9 TYPE 2 DIABETES MELLITUS WITHOUT COMPLICATION, WITHOUT LONG-TERM CURRENT USE OF INSULIN (H): ICD-10-CM

## 2023-11-17 DIAGNOSIS — E11.9 TYPE 2 DIABETES MELLITUS WITHOUT COMPLICATION, WITHOUT LONG-TERM CURRENT USE OF INSULIN (H): Primary | ICD-10-CM

## 2023-11-17 PROBLEM — O24.319 MODIFIED WHITE CLASS B PREGESTATIONAL DIABETES MELLITUS: Status: ACTIVE | Noted: 2023-11-17

## 2023-11-17 LAB
ERYTHROCYTE [DISTWIDTH] IN BLOOD BY AUTOMATED COUNT: 12.3 % (ref 10–15)
HBA1C MFR BLD: 6.8 % (ref 0–5.6)
HBV SURFACE AG SERPL QL IA: NONREACTIVE
HCG INTACT+B SERPL-ACNC: ABNORMAL MIU/ML
HCT VFR BLD AUTO: 37.6 % (ref 35–47)
HGB BLD-MCNC: 12.6 G/DL (ref 11.7–15.7)
HIV 1+2 AB+HIV1 P24 AG SERPL QL IA: NONREACTIVE
MCH RBC QN AUTO: 28.3 PG (ref 26.5–33)
MCHC RBC AUTO-ENTMCNC: 33.5 G/DL (ref 31.5–36.5)
MCV RBC AUTO: 85 FL (ref 78–100)
PLATELET # BLD AUTO: 207 10E3/UL (ref 150–450)
RBC # BLD AUTO: 4.45 10E6/UL (ref 3.8–5.2)
RUBV IGG SERPL QL IA: 3.47 INDEX
RUBV IGG SERPL QL IA: POSITIVE
T PALLIDUM AB SER QL: NONREACTIVE
WBC # BLD AUTO: 9.7 10E3/UL (ref 4–11)

## 2023-11-17 PROCEDURE — 86900 BLOOD TYPING SEROLOGIC ABO: CPT

## 2023-11-17 PROCEDURE — 86762 RUBELLA ANTIBODY: CPT

## 2023-11-17 PROCEDURE — 86780 TREPONEMA PALLIDUM: CPT

## 2023-11-17 PROCEDURE — 87340 HEPATITIS B SURFACE AG IA: CPT

## 2023-11-17 PROCEDURE — 83036 HEMOGLOBIN GLYCOSYLATED A1C: CPT

## 2023-11-17 PROCEDURE — 87086 URINE CULTURE/COLONY COUNT: CPT

## 2023-11-17 PROCEDURE — 76801 OB US < 14 WKS SINGLE FETUS: CPT | Performed by: FAMILY MEDICINE

## 2023-11-17 PROCEDURE — 86850 RBC ANTIBODY SCREEN: CPT

## 2023-11-17 PROCEDURE — 85027 COMPLETE CBC AUTOMATED: CPT

## 2023-11-17 PROCEDURE — 84443 ASSAY THYROID STIM HORMONE: CPT | Performed by: OBSTETRICS & GYNECOLOGY

## 2023-11-17 PROCEDURE — 36415 COLL VENOUS BLD VENIPUNCTURE: CPT

## 2023-11-17 PROCEDURE — 76817 TRANSVAGINAL US OBSTETRIC: CPT | Performed by: FAMILY MEDICINE

## 2023-11-17 PROCEDURE — 84702 CHORIONIC GONADOTROPIN TEST: CPT | Performed by: OBSTETRICS & GYNECOLOGY

## 2023-11-17 PROCEDURE — 87389 HIV-1 AG W/HIV-1&-2 AB AG IA: CPT

## 2023-11-17 PROCEDURE — 86901 BLOOD TYPING SEROLOGIC RH(D): CPT

## 2023-11-17 NOTE — CONFIDENTIAL NOTE
RECORDS RECEIVED FROM: internal    DATE RECEIVED: 11.21.23    NOTES (FOR ALL VISITS) STATUS DETAILS   OFFICE NOTES from referring provider internal    Baltazar Knowles MD        MEDICATION LIST internal     IMAGING        XR (Chest) internal  6.27.23, 3.9.22, 1.9.22    CT (HEAD/NECK/CHEST/ABDOMEN) internal  6.27.23     LABS     DIABETES: HBGA1C, CREATININE, FASTING LIPIDS, MICROALBUMIN URINE, POTASSIUM, TSH, T4    THYROID: TSH, T4, CBC, THYRODLONULIN, TOTAL T3, FREE T4, CALCITONIN, CEA internal  HBGA1C- 11/17/23   Cbc- 11/17/23   CMP- 6.27.23

## 2023-11-17 NOTE — TELEPHONE ENCOUNTER
TALKED TO PT AND RESCHEDULED  HER 04/09 APPT SOONER DATE PER GDM PROTOCOLS   Natalia Hastings on 11/17/2023 at 12:32 PM

## 2023-11-17 NOTE — TELEPHONE ENCOUNTER
I contacted Jo Ann to help schedule her diabetes education appts. This is her 4th pregnancy. She has had GDM with all of her pregnancies and used insulin to control BG's during the pregnancy. Her most recent A1C is 6.8%, so now has type 2 diabetes. Previous A1C's were in the pre-diabetes range.    I asked her if it was okay for her first visit to be virtual as [place] and [place] do not have any in-person openings until December. We scheduled her first appt for Monday with Dorothy. She would like go to [facility] for future appts and we scheduled her first appt in [place] for December 7th. She has an Endo appt next Tuesday. I did review she may need a virtual follow-up before the 12-7 appt, but I will let her discuss this further with Dorothy on Monday.    Agata Davis RN, [facility]

## 2023-11-17 NOTE — TELEPHONE ENCOUNTER
Spoke with patient. Pt states she has only checked BG a couple times the last two days. Pt will check BG fasting in the morning this weekend. Pt would like call back on 11/20/23 at 10am to give readings over the phone. Glory Mistry CMA on 11/17/2023 at 2:41 PM

## 2023-11-17 NOTE — TELEPHONE ENCOUNTER
Reason for Call:  Appointment Request    Patient requesting this type of appt:  GDM Initial    Requested provider:  [facility] or [facility]    Reason patient unable to be scheduled: Not within requested timeframe    When does patient want to be seen/preferred time: 3-7 days    Comments: Pt needing an initial GDM appointment withing 3-7 days per referral. Pt prefers in person at either [facility] or [facility].    Okay to leave a detailed message?: Yes at Cell number on file:    Telephone Information:   Mobile [phone]       Call taken on 11/17/2023 at 11:55 AM by Princess Spivey

## 2023-11-17 NOTE — PROGRESS NOTES
Outcome for 23 2:38 PM: Reached patient but requests call back at 10am on 23  Glory Mistry MA  Outcome for 23 1:09 PM: Left Voicemail   Glory Mistry MA  Outcome for 23 4:13 PM: Data obtained via phone and located below  Glory Mistry MA    23  Morning fastin    23  Morning fastin    23  Morning fastin  1 hour after lunch: 161  2 hours after lunch: 137

## 2023-11-18 LAB — BACTERIA UR CULT: NO GROWTH

## 2023-11-20 ENCOUNTER — VIRTUAL VISIT (OUTPATIENT)
Dept: EDUCATION SERVICES | Facility: CLINIC | Age: 43
End: 2023-11-20

## 2023-11-20 DIAGNOSIS — E11.9 TYPE 2 DIABETES MELLITUS WITHOUT COMPLICATION, WITHOUT LONG-TERM CURRENT USE OF INSULIN (H): Primary | ICD-10-CM

## 2023-11-20 PROCEDURE — G0108 DIAB MANAGE TRN  PER INDIV: HCPCS | Mod: 95

## 2023-11-20 NOTE — PROGRESS NOTES
Diabetes Self-Management Education & Support  Type of Service: Telephone Visit/ 70 minutes     Originating Location (Patient Location): Home  [place] [facility] [place] Communication:  Telephone     Telephone Visit Start Time: 12:34 PM  Telephone Visit End Time (telephone visit stop time): 1:44 PM    How would patient like to obtain AVS? Mail a copy    Presents for:  initial visit for Type 2 diabetes and pregnancy     ASSESSMENT:  Ketones: not currently monitoring.   Fasting blood glucoses: 0% in target (only 3).  After breakfast: 0% in target (only 1)    Patient here for initial visit this pregnancy.  States this pregnancy is the first time she has been told of a diagnosis of type 2 diabetes.  Reports 4 previous pregnancies, one ectopic.  Reports she did have GDM in the other 3 previous pregnancies and was treated with insulin in all of them. As best patient recalls, it sounds as though she required insulin earlier in each subsequent pregnancy.      Not currently on any medications for diabetes.  As best writer understands, she has not had any recent medical care due to a lack of health insurance.  Reports she is currently awaiting determination of health insurance coverage.  She accepted and writer placed a referral to care coordination to assist with obtaining health insurance.     Recently started checking blood sugars.  Out of pocket, the strips for her current Accu Chek Guide meter are quite expensive.  For now, recommended patient obtain a free Arkray meter at any [place] pharmacy as the strips for this meter are about $11 for 50.  Patient stated agreement with this plan and that she has an appointment at the [facility] on Friday, 11/24/23.  Discussed when to check blood sugars and blood sugar goals.  Due to insufficient visit duration AND lack of insurance, did not educate on urine ketone monitoring today.     Discussed impact of physical activity on glucoses.   With emory of OB provider, recommended patient walk for 5 or so minutes after each meal to help with post meal glucose control.     Discussed foods containing carbohydrates and instructed patient on reading a food label for total carbohydrates.  Patient practiced reading the bread label in her home for total carbohydrates. Introduced recommended meal plan with an emphasis on: having breakfast within 1 hour of waking for the day, a meal or snack every 2-3 hours while awake, and a minimum of 175 grams of carbohydrate per day.  Reviewed carbohydrate amounts in preferred foods and how to estimate. Encouraged to download [employer] samuel and will mail written material.     Opportunities for ongoing education and support in diabetes-self management were discussed.    Pt verbalized understanding of concepts discussed and recommendations provided today.       INTERVENTION:  Patient presented for Type 2 Diabetes. Advised to check glucose at least 4 times a day, before breakfast and 1 hour after the start of each meal. and Reviewed carbohydrate counting, label reading, and meal plan of 30 grams carb at breakfast, 45-60 grams of carb at lunch, 45-60 grams of carb at dinner, and 15-30 grams of carb at snacks. Encouraged balanced meals, including protein and fat with carb at all meals and snacks.    Referral to care team:  None, scheduled with endocrinology tomorrow, 11/21/23.    Education Mailed Provided:  Carbohydrate Counting and Glucose Log Book    PLAN:  Test glucose 4 times per day:   Fasting (when you first awake for the day): 95 mg/dL or below   1 hour after breakfast: 140 mg/dL or below   1 hour after lunch: 140 mg/dL or below   1 hour after dinner: 140 mg/dL or below     Please bring your meter and log book to all appointments     If you miss 1 hour after meal test, test 2 hours after the meal.  Goal 2 hours after is 120 mg/dL or below.     2.  Meal Plan    Snack: 15-30 grams carbohydrate + protein  Breakfast: 30 grams  carbohydrate + protein   Lunch: 45-60 grams carbohydrate + protein  Snack: 15-30 grams carbohydrate + protein  Dinner: 45-60 grams carbohydrate + protein  Snack: 15-30 grams carbohydrate + protein    A few tips:   -consume some carbohydrate every 2-3 hours while awake   -you need a minimum of 175 grams of carbohydrate per day   -have a meal or snack within 1 hour of breakfast   -fruit and cold breakfast cereal are best tolerated at lunch or later   -protein includes: cheese, eggs, fish, nuts, nut butter, chicken, turkey, beef, and pork   -snack ideas: an individual container of Greek yogurt (try [employer] Less Sugar), whole grain crackers and cheese, chocolate fairlife milk, a Kashi or KIND bar, a baseball size piece of whole fruit + nut butter (apple + peanut butter), fruit canned in it's own juice + cottage cheese    3.  Aim for 20-30 minutes of activity most days of the week (with the okay of your OB provider).      4.  Follow up: Tuesday, 11/21/23 with endocrinology and Friday, 11/24/23 via phone with Dorothy    5.  Call Diabetes Education at [phone] or send a [employer] message with:   -questions or concerns   -ketones that are small, moderate, or large   -3 or more blood sugars above target in a 7 day period    See Care Plan for co-developed, patient-stated behavior change goals.    SUBJECTIVE/OBJECTIVE:  Accompanied by: Self  Diabetes education in the past 24mo: No  Diabetes type: Type 2  Date of diagnosis: reports diagnosed with Type 2 diabetes this pregnancy  How confident are you filling out medical forms by yourself:: Not Assessed  Cultural Influences/Ethnic Background:  Not  or       Diabetes Symptoms & Complications  Complications assessed today?: No    Patient Problem List and Family Medical History reviewed for relevant medical history, current medical status, and diabetes risk factors.    Vitals:  LMP 09/10/2023 (Approximate)     Pre pregnancy weight: not discussed  Weight gain: not  "assessed today    Estimated Date of Delivery: Jun 16, 2024    Labs:  Lab Results   Component Value Date    A1C 6.8 11/17/2023     Lab Results   Component Value Date     06/27/2023    GLC 95 12/29/2018     No results found for: \"LDL\"  No results found for: \"HDL\"]  GFR Estimate   Date Value Ref Range Status   06/27/2023 >90 >60 mL/min/1.73m2 Final   12/29/2018 >90 >60 mL/min/[1.73_m2] Final     Comment:     Non  GFR Calc  Starting 12/18/2018, serum creatinine based estimated GFR (eGFR) will be   calculated using the Chronic Kidney Disease Epidemiology Collaboration   (CKD-EPI) equation.       GFR Estimate If Black   Date Value Ref Range Status   12/29/2018 >90 >60 mL/min/[1.73_m2] Final     Comment:      GFR Calc  Starting 12/18/2018, serum creatinine based estimated GFR (eGFR) will be   calculated using the Chronic Kidney Disease Epidemiology Collaboration   (CKD-EPI) equation.       Lab Results   Component Value Date    CR 0.64 06/27/2023    CR 0.81 12/29/2018     No results found for: \"MICROALBUMIN\"    Last 3 BP:   BP Readings from Last 3 Encounters:   10/16/23 118/62   06/27/23 131/66   05/05/22 114/64       History   Smoking Status    Never   Smokeless Tobacco    Never       Healthy Eating  Healthy Eating Assessed Today: Yes  Cultural/Sabianist diet restrictions?: No  How many times a week on average do you eat food made away from home (restaurant/take-out)?:  (1-2)  Breakfast: 8-9:30 AM: 2 eggs with injera (1/2), coffee with milk  Lunch: 12-2 PM: injera with beef in sauce OR injera with lentils, ICE sparkling water OR Vitamin Water Zero  Dinner: 7-7:30 PM: last night = rice with vegetables and chicken, ICE sparkling water OR Vitamin Water Zero  Snacks: AM and/or PM: sometimes 1/2 liam, orange, banana, peanuts, OR roast wheat with peanuts; HS: last night = peanuts  Other: up for day at 5:30 AM (on school days); to bed at 11 PM  Has patient met with a dietitian in the past?: " Yes    Being Active  Being Active Assessed Today: Yes  Exercise:: Currently not exercising (ADLs, occasional walk outside)    Monitoring  Monitoring Assessed Today: Yes  Did patient bring glucose meter to appointment? : Yes  Blood Glucose Meter: Accu-chek  Times checking blood sugar at home (number):  (1-2)  Times checking blood sugar at home (per): Day    Recent glucoses reported by patient:   After lunch: 103 (3 hours after)  11/18: 124 fasting  Yesterday, 11/19: fasting 122 and after breakfast 142*  Today, 11/20: fasting 121  * = 2 hours after    Checking for urine ketones? No    Taking Medications  Taking Medication Assessed Today: No  Current Treatments: Diet    Problem Solving  Problem Solving Assessed Today: Yes  Is the patient at risk for hypoglycemia?: No    Reducing Risks  Reducing Risks Assessed Today: Yes  Diabetes Risks: History of gestational diabetes  Additional female risks: Gestational Diabetes (in 3 previous pregnancies)  CAD Risks: Diabetes Mellitus  Has dilated eye exam at least once a year?: No  Sees dentist every 6 months?: No (last 3-4 years ago)    Healthy Coping  Healthy Coping Assessed Today: Yes  Emotional response to diabetes: Ready to learn  Informal Support system:: Spouse    Patient Activation Measure Survey Score:       No data to display                Care Plan and Education Provided:  Care Plan: Diabetes   Updates made by Francisca Garner RD since 11/20/2023 12:00 AM        Problem: HbA1C Not In Goal         Goal: Establish Regular Follow-Ups with PCP         Task: Discuss with PCP the recommended timing for patient's next follow up visit(s)    Responsible User: Francisca Garner RD        Task: Discuss schedule for PCP visits with patient    Responsible User: Francisca Garner RD        Goal: Get HbA1C Level in Goal         Task: Educate patient on diabetes education self-management topics    Responsible User: Francisca Garner RD        Task: Educate patient on  benefits of regular glucose monitoring    Responsible User: Francisca Garner RD        Task: Refer patient to appropriate extended care team member, as needed (Medication Therapy Management, Behavioral Health, Physical Therapy, etc.)    Responsible User: Francisca Garner RD        Task: Discuss diabetes treatment plan with patient    Responsible User: Francisca Garner RD        Problem: Diabetes Self-Management Education Needed to Optimize Self-Care Behaviors         Goal: Understand diabetes pathophysiology and disease progression         Task: Provide education on diabetes pathophysiology and disease progression specfic to patient's diabetes type    Responsible User: Francisca Garner RD        Goal: Healthy Eating - follow a healthy eating pattern for diabetes         Task: Provide education on portion control and consistency in amount, composition and timing of food intake    Responsible User: Francisca Garner RD        Task: Provide education on managing carbohydrate intake (carbohydrate counting, plate planning method, etc.) Completed 11/20/2023   Responsible User: Francisca Garner RD        Task: Provide education on weight management    Responsible User: Francisca Garner RD        Task: Provide education on heart healthy eating    Responsible User: Francisca Garner RD        Task: Provide education on eating out    Responsible User: Francisca Garner RD        Task: Develop individualized healthy eating plan with patient    Responsible User: Francisca Garner RD        Goal: Being Active - get regular physical activity, working up to at least 150 minutes per week         Task: Provide education on relationship of activity to glucose and precautions to take if at risk for low glucose Completed 11/20/2023   Responsible User: Francisca Garner RD        Task: Discuss barriers to physical activity with patient    Responsible User: Francisca Garner RD        Task:  Develop physical activity plan with patient    Responsible User: Francisca Garner RD        Task: Explore community resources including walking groups, assistance programs, and home videos    Responsible User: Francisca Garner RD        Goal: Monitoring - monitor glucose and ketones as directed    This Visit's Progress: 30%   Note:    Test glucose 4 times/day: fasting and 1 hour after each meal.        Task: Provide education on blood glucose monitoring (purpose, proper technique, frequency, glucose targets, interpreting results, when to use glucose control solution, sharps disposal) Completed 11/20/2023   Responsible User: Francisca Garner RD        Task: Provide education on continuous glucose monitoring (sensor placement, use of samuel or /reader, understanding glucose trends, alerts and alarms, differences between sensor glucose and blood glucose)    Responsible User: Francisca Garner RD        Task: Provide education on ketone monitoring (when to monitor, frequency, etc.)    Responsible User: Francisca Garner RD        Goal: Taking Medication - patient is consistently taking medications as directed         Task: Provide education on action of prescribed medication, including when to take and possible side effects    Responsible User: Francisca Garner RD        Task: Provide education on insulin and injectable diabetes medications, including administration, storage, site selection and rotation for injection sites    Responsible User: Francisca Garner RD        Task: Discuss barriers to medication adherence with patient and provide management technique ideas as appropriate    Responsible User: Francisca Garner RD        Task: Provide education on frequency and refill details of medications    Responsible User: Francisca Garner RD        Goal: Problem Solving - know how to prevent and manage short-term diabetes complications         Task: Provide education on high blood  glucose - causes, signs/symptoms, prevention and treatment    Responsible User: Francisca Garner RD        Task: Provide education on low blood glucose - causes, signs/symptoms, prevention, treatment, carrying a carbohydrate source at all times, and medical identification    Responsible User: Francisca Garner RD        Task: Provide education on safe travel with diabetes    Responsible User: Francisca Garner RD        Task: Provide education on how to care for diabetes on sick days    Responsible User: Francisca Garner RD        Task: Provide education on when to call a health care provider    Responsible User: Francisca Garner RD        Goal: Reducing Risks - know how to prevent and treat long-term diabetes complications         Task: Provide education on major complications of diabetes, prevention, early diagnostic measures and treatment of complications    Responsible User: Francisca Garner RD        Task: Provide education on recommended care for dental, eye and foot health    Responsible User: Francisca Garner RD        Task: Provide education on Hemoglobin A1c - goals and relationship to blood glucose levels    Responsible User: Francisca Garner RD        Task: Provide education on recommendations for heart health - lipid levels and goals, blood pressure and goals, and aspirin therapy, if indicated    Responsible User: Francisca Garner RD        Task: Provide education on tobacco cessation    Responsible User: Francisca Garner RD        Goal: Healthy Coping - use available resources to cope with the challenges of managing diabetes         Task: Discuss recognizing feelings about having diabetes    Responsible User: Francisca Garner RD        Task: Provide education on the benefits of making appropriate lifestyle changes    Responsible User: Francisca Ganrer RD        Task: Provide education on benefits of utilizing support systems Completed 11/20/2023    Responsible User: Francisca Garner RD        Task: Discuss methods for coping with stress    Responsible User: Francisca Garner RD        Task: Provide education on when to seek professional counseling    Responsible User: Francisca Garner RD Beth Reisdorf, MPH, RD, CDCES, LD 11/20/2023    Time Spent: 70 minutes  Encounter Type: Individual    Any diabetes medication dose changes were made via the CDE Protocol per the patient's referring provider and endocrinology provider. A copy of this encounter was shared with the provider.

## 2023-11-20 NOTE — LETTER
11/20/2023         RE: Jo Ann Mckeon  [address]  [facility] [address]        Dear Colleague,    Thank you for referring your patient, Jo Ann Mckeon, to the [facility]. Please see a copy of my visit note below.    Diabetes Self-Management Education & Support  Type of Service: Telephone Visit/ 70 minutes     Originating Location (Patient Location): Home  Distant Location (Provider Location): [place] [facility]  Mode of Communication:  Telephone     Telephone Visit Start Time: 12:34 PM  Telephone Visit End Time (telephone visit stop time): 1:44 PM    How would patient like to obtain AVS? Mail a copy    Presents for:  initial visit for Type 2 diabetes and pregnancy     ASSESSMENT:  Ketones: not currently monitoring.   Fasting blood glucoses: 0% in target (only 3).  After breakfast: 0% in target (only 1)    Patient here for initial visit this pregnancy.  States this pregnancy is the first time she has been told of a diagnosis of type 2 diabetes.  Reports 4 previous pregnancies, one ectopic.  Reports she did have GDM in the other 3 previous pregnancies and was treated with insulin in all of them. As best patient recalls, it sounds as though she required insulin earlier in each subsequent pregnancy.      Not currently on any medications for diabetes.  As best writer understands, she has not had any recent medical care due to a lack of health insurance.  Reports she is currently awaiting determination of health insurance coverage.  She accepted and writer placed a referral to care coordination to assist with obtaining health insurance.     Recently started checking blood sugars.  Out of pocket, the strips for her current Accu Chek Guide meter are quite expensive.  For now, recommended patient obtain a free Arkray meter at any [facility] [place] pharmacy as the strips for this meter are about $11 for 50.  Patient stated agreement with this plan and that she has an appointment at the [facility]  on Friday, 11/24/23.  Discussed when to check blood sugars and blood sugar goals.  Due to insufficient visit duration AND lack of insurance, did not educate on urine ketone monitoring today.     Discussed impact of physical activity on glucoses.  With okay of OB provider, recommended patient walk for 5 or so minutes after each meal to help with post meal glucose control.     Discussed foods containing carbohydrates and instructed patient on reading a food label for total carbohydrates.  Patient practiced reading the bread label in her home for total carbohydrates. Introduced recommended meal plan with an emphasis on: having breakfast within 1 hour of waking for the day, a meal or snack every 2-3 hours while awake, and a minimum of 175 grams of carbohydrate per day.  Reviewed carbohydrate amounts in preferred foods and how to estimate. Encouraged to download Nichewith samuel and will mail written material.     Opportunities for ongoing education and support in diabetes-self management were discussed.    Pt verbalized understanding of concepts discussed and recommendations provided today.       INTERVENTION:  Patient presented for Type 2 Diabetes. Advised to check glucose at least 4 times a day, before breakfast and 1 hour after the start of each meal. and Reviewed carbohydrate counting, label reading, and meal plan of 30 grams carb at breakfast, 45-60 grams of carb at lunch, 45-60 grams of carb at dinner, and 15-30 grams of carb at snacks. Encouraged balanced meals, including protein and fat with carb at all meals and snacks.    Referral to care team:  None, scheduled with endocrinology tomorrow, 11/21/23.    Education Mailed Provided:  Carbohydrate Counting and Glucose Log Book    PLAN:  Test glucose 4 times per day:   Fasting (when you first awake for the day): 95 mg/dL or below   1 hour after breakfast: 140 mg/dL or below   1 hour after lunch: 140 mg/dL or below   1 hour after dinner: 140 mg/dL or below     Please  bring your meter and log book to all appointments     If you miss 1 hour after meal test, test 2 hours after the meal.  Goal 2 hours after is 120 mg/dL or below.     2.  Meal Plan    Snack: 15-30 grams carbohydrate + protein  Breakfast: 30 grams carbohydrate + protein   Lunch: 45-60 grams carbohydrate + protein  Snack: 15-30 grams carbohydrate + protein  Dinner: 45-60 grams carbohydrate + protein  Snack: 15-30 grams carbohydrate + protein    A few tips:   -consume some carbohydrate every 2-3 hours while awake   -you need a minimum of 175 grams of carbohydrate per day   -have a meal or snack within 1 hour of breakfast   -fruit and cold breakfast cereal are best tolerated at lunch or later   -protein includes: cheese, eggs, fish, nuts, nut butter, chicken, turkey, beef, and pork   -snack ideas: an individual container of Greek yogurt (try Chobani Less Sugar), whole grain crackers and cheese, chocolate fairlife milk, a Kashi or KIND bar, a baseball size piece of whole fruit + nut butter (apple + peanut butter), fruit canned in it's own juice + cottage cheese    3.  Aim for 20-30 minutes of activity most days of the week (with the okay of your OB provider).      4.  Follow up: Tuesday, 11/21/23 with endocrinology and Friday, 11/24/23 via phone with Dorothy    5.  Call Diabetes Education at [phone] or send a Therapydia message with:   -questions or concerns   -ketones that are small, moderate, or large   -3 or more blood sugars above target in a 7 day period    See Care Plan for co-developed, patient-stated behavior change goals.    SUBJECTIVE/OBJECTIVE:  Accompanied by: Self  Diabetes education in the past 24mo: No  Diabetes type: Type 2  Date of diagnosis: reports diagnosed with Type 2 diabetes this pregnancy  How confident are you filling out medical forms by yourself:: Not Assessed  Cultural Influences/Ethnic Background:  Not  or       Diabetes Symptoms & Complications  Complications assessed today?:  "No    Patient Problem List and Family Medical History reviewed for relevant medical history, current medical status, and diabetes risk factors.    Vitals:  LMP 09/10/2023 (Approximate)     Pre pregnancy weight: not discussed  Weight gain: not assessed today    Estimated Date of Delivery: Jun 16, 2024    Labs:  Lab Results   Component Value Date    A1C 6.8 11/17/2023     Lab Results   Component Value Date     06/27/2023    GLC 95 12/29/2018     No results found for: \"LDL\"  No results found for: \"HDL\"]  GFR Estimate   Date Value Ref Range Status   06/27/2023 >90 >60 mL/min/1.73m2 Final   12/29/2018 >90 >60 mL/min/[1.73_m2] Final     Comment:     Non  GFR Calc  Starting 12/18/2018, serum creatinine based estimated GFR (eGFR) will be   calculated using the Chronic Kidney Disease Epidemiology Collaboration   (CKD-EPI) equation.       GFR Estimate If Black   Date Value Ref Range Status   12/29/2018 >90 >60 mL/min/[1.73_m2] Final     Comment:      GFR Calc  Starting 12/18/2018, serum creatinine based estimated GFR (eGFR) will be   calculated using the Chronic Kidney Disease Epidemiology Collaboration   (CKD-EPI) equation.       Lab Results   Component Value Date    CR 0.64 06/27/2023    CR 0.81 12/29/2018     No results found for: \"MICROALBUMIN\"    Last 3 BP:   BP Readings from Last 3 Encounters:   10/16/23 118/62   06/27/23 131/66   05/05/22 114/64       History   Smoking Status    Never   Smokeless Tobacco    Never       Healthy Eating  Healthy Eating Assessed Today: Yes  Cultural/Holiness diet restrictions?: No  How many times a week on average do you eat food made away from home (restaurant/take-out)?:  (1-2)  Breakfast: 8-9:30 AM: 2 eggs with injera (1/2), coffee with milk  Lunch: 12-2 PM: injera with beef in sauce OR injera with lentils, ICE sparkling water OR Vitamin Water Zero  Dinner: 7-7:30 PM: last night = rice with vegetables and chicken, ICE sparkling water OR Vitamin " Water Zero  Snacks: AM and/or PM: sometimes 1/2 liam, orange, banana, peanuts, OR roast wheat with peanuts; HS: last night = peanuts  Other: up for day at 5:30 AM (on school days); to bed at 11 PM  Has patient met with a dietitian in the past?: Yes    Being Active  Being Active Assessed Today: Yes  Exercise:: Currently not exercising (ADLs, occasional walk outside)    Monitoring  Monitoring Assessed Today: Yes  Did patient bring glucose meter to appointment? : Yes  Blood Glucose Meter: Accu-chek  Times checking blood sugar at home (number):  (1-2)  Times checking blood sugar at home (per): Day    Recent glucoses reported by patient:   After lunch: 103 (3 hours after)  11/18: 124 fasting  Yesterday, 11/19: fasting 122 and after breakfast 142*  Today, 11/20: fasting 121  * = 2 hours after    Checking for urine ketones? No    Taking Medications  Taking Medication Assessed Today: No  Current Treatments: Diet    Problem Solving  Problem Solving Assessed Today: Yes  Is the patient at risk for hypoglycemia?: No    Reducing Risks  Reducing Risks Assessed Today: Yes  Diabetes Risks: History of gestational diabetes  Additional female risks: Gestational Diabetes (in 3 previous pregnancies)  CAD Risks: Diabetes Mellitus  Has dilated eye exam at least once a year?: No  Sees dentist every 6 months?: No (last 3-4 years ago)    Healthy Coping  Healthy Coping Assessed Today: Yes  Emotional response to diabetes: Ready to learn  Informal Support system:: Spouse    Patient Activation Measure Survey Score:       No data to display                Care Plan and Education Provided:  Care Plan: Diabetes   Updates made by Francisca Garner RD since 11/20/2023 12:00 AM        Problem: HbA1C Not In Goal         Goal: Establish Regular Follow-Ups with PCP         Task: Discuss with PCP the recommended timing for patient's next follow up visit(s)    Responsible User: Francisca Garner RD        Task: Discuss schedule for PCP visits with  patient    Responsible User: Francisca Garner RD        Goal: Get HbA1C Level in Goal         Task: Educate patient on diabetes education self-management topics    Responsible User: Francisca Garner RD        Task: Educate patient on benefits of regular glucose monitoring    Responsible User: Francisca Garner RD        Task: Refer patient to appropriate extended care team member, as needed (Medication Therapy Management, Behavioral Health, Physical Therapy, etc.)    Responsible User: Francisca Garner RD        Task: Discuss diabetes treatment plan with patient    Responsible User: Francisca Garner RD        Problem: Diabetes Self-Management Education Needed to Optimize Self-Care Behaviors         Goal: Understand diabetes pathophysiology and disease progression         Task: Provide education on diabetes pathophysiology and disease progression specfic to patient's diabetes type    Responsible User: Francisca Garner RD        Goal: Healthy Eating - follow a healthy eating pattern for diabetes         Task: Provide education on portion control and consistency in amount, composition and timing of food intake    Responsible User: Francisca Garner RD        Task: Provide education on managing carbohydrate intake (carbohydrate counting, plate planning method, etc.) Completed 11/20/2023   Responsible User: Francisca Garner RD        Task: Provide education on weight management    Responsible User: Francisca Garner RD        Task: Provide education on heart healthy eating    Responsible User: Francisca Garner RD        Task: Provide education on eating out    Responsible User: Francisca Garner RD        Task: Develop individualized healthy eating plan with patient    Responsible User: Francisca Garner RD        Goal: Being Active - get regular physical activity, working up to at least 150 minutes per week         Task: Provide education on relationship of activity to glucose  and precautions to take if at risk for low glucose Completed 11/20/2023   Responsible User: Francisca Garner RD        Task: Discuss barriers to physical activity with patient    Responsible User: Francisca Garner RD        Task: Develop physical activity plan with patient    Responsible User: Francisca Garner RD        Task: Explore community resources including walking groups, assistance programs, and home videos    Responsible User: Francisca Garner RD        Goal: Monitoring - monitor glucose and ketones as directed    This Visit's Progress: 30%   Note:    Test glucose 4 times/day: fasting and 1 hour after each meal.        Task: Provide education on blood glucose monitoring (purpose, proper technique, frequency, glucose targets, interpreting results, when to use glucose control solution, sharps disposal) Completed 11/20/2023   Responsible User: Francisca Garner RD        Task: Provide education on continuous glucose monitoring (sensor placement, use of samuel or /reader, understanding glucose trends, alerts and alarms, differences between sensor glucose and blood glucose)    Responsible User: Francisca Garner RD        Task: Provide education on ketone monitoring (when to monitor, frequency, etc.)    Responsible User: Francisca Garner RD        Goal: Taking Medication - patient is consistently taking medications as directed         Task: Provide education on action of prescribed medication, including when to take and possible side effects    Responsible User: Francisca Garner RD        Task: Provide education on insulin and injectable diabetes medications, including administration, storage, site selection and rotation for injection sites    Responsible User: Francisca Garner RD        Task: Discuss barriers to medication adherence with patient and provide management technique ideas as appropriate    Responsible User: Francisca Garner RD        Task: Provide education  on frequency and refill details of medications    Responsible User: Francisca Garner RD        Goal: Problem Solving - know how to prevent and manage short-term diabetes complications         Task: Provide education on high blood glucose - causes, signs/symptoms, prevention and treatment    Responsible User: Francisca Garner RD        Task: Provide education on low blood glucose - causes, signs/symptoms, prevention, treatment, carrying a carbohydrate source at all times, and medical identification    Responsible User: Francisca Garner RD        Task: Provide education on safe travel with diabetes    Responsible User: Francisca Garner RD        Task: Provide education on how to care for diabetes on sick days    Responsible User: Francisca Garner RD        Task: Provide education on when to call a health care provider    Responsible User: Francisca Garner RD        Goal: Reducing Risks - know how to prevent and treat long-term diabetes complications         Task: Provide education on major complications of diabetes, prevention, early diagnostic measures and treatment of complications    Responsible User: Francisca Garner RD        Task: Provide education on recommended care for dental, eye and foot health    Responsible User: Francisca Garner RD        Task: Provide education on Hemoglobin A1c - goals and relationship to blood glucose levels    Responsible User: Francisca Garner RD        Task: Provide education on recommendations for heart health - lipid levels and goals, blood pressure and goals, and aspirin therapy, if indicated    Responsible User: Francisca Garner RD        Task: Provide education on tobacco cessation    Responsible User: Francisca Garner RD        Goal: Healthy Coping - use available resources to cope with the challenges of managing diabetes         Task: Discuss recognizing feelings about having diabetes    Responsible User: Francisca Garner RD         Task: Provide education on the benefits of making appropriate lifestyle changes    Responsible User: Francisca Garner RD        Task: Provide education on benefits of utilizing support systems Completed 11/20/2023   Responsible User: Francisca Garner RD        Task: Discuss methods for coping with stress    Responsible User: Francisca Garner RD        Task: Provide education on when to seek professional counseling    Responsible User: Francisca Garner RD Beth Reisdorf, MPH, RD, CDCES, LD 11/20/2023    Time Spent: 70 minutes  Encounter Type: Individual    Any diabetes medication dose changes were made via the CDE Protocol per the patient's referring provider and endocrinology provider. A copy of this encounter was shared with the provider.

## 2023-11-20 NOTE — PATIENT INSTRUCTIONS
Test glucose 4 times per day:   Fasting (when you first awake for the day): 95 mg/dL or below   1 hour after breakfast: 140 mg/dL or below   1 hour after lunch: 140 mg/dL or below   1 hour after dinner: 140 mg/dL or below     Please bring your meter and log book to all appointments     If you miss 1 hour after meal test, test 2 hours after the meal.  Goal 2 hours after is 120 mg/dL or below.     2.  Meal Plan    Snack: 15-30 grams carbohydrate + protein  Breakfast: 30 grams carbohydrate + protein   Lunch: 45-60 grams carbohydrate + protein  Snack: 15-30 grams carbohydrate + protein  Dinner: 45-60 grams carbohydrate + protein  Snack: 15-30 grams carbohydrate + protein    A few tips:   -consume some carbohydrate every 2-3 hours while awake   -you need a minimum of 175 grams of carbohydrate per day   -have a meal or snack within 1 hour of breakfast   -fruit and cold breakfast cereal are best tolerated at lunch or later   -protein includes: cheese, eggs, fish, nuts, nut butter, chicken, turkey, beef, and pork   -snack ideas: an individual container of Greek yogurt (try Chobani Less Sugar), whole grain crackers and cheese, chocolate fairlife milk, a Kashi or KIND bar, a baseball size piece of whole fruit + nut butter (apple + peanut butter), fruit canned in it's own juice + cottage cheese    3.  Aim for 20-30 minutes of activity most days of the week (with the okay of your OB provider).      4.  Follow up: Tuesday, 11/21/23 with endocrinology and Friday, 11/24/23 via phone with Dorothy    5.  Call Diabetes Education at [phone] or send a Porch message with:   -questions or concerns   -ketones that are small, moderate, or large   -3 or more blood sugars above target in a 7 day period    Thank you,     Dorothy Garner, MPH, RD, CDCES, LD 11/20/2023

## 2023-11-20 NOTE — TELEPHONE ENCOUNTER
23  Morning fastin    23  Morning fastin    23  Morning fastin  1 hour after lunch: 161  2 hours after lunch: 137

## 2023-11-21 ENCOUNTER — PATIENT OUTREACH (OUTPATIENT)
Dept: CARE COORDINATION | Facility: CLINIC | Age: 43
End: 2023-11-21

## 2023-11-21 ENCOUNTER — TELEPHONE (OUTPATIENT)
Dept: EDUCATION SERVICES | Facility: CLINIC | Age: 43
End: 2023-11-21

## 2023-11-21 ENCOUNTER — VIRTUAL VISIT (OUTPATIENT)
Dept: ENDOCRINOLOGY | Facility: CLINIC | Age: 43
End: 2023-11-21
Attending: OBSTETRICS & GYNECOLOGY

## 2023-11-21 DIAGNOSIS — E11.9 TYPE 2 DIABETES MELLITUS WITHOUT COMPLICATION, WITHOUT LONG-TERM CURRENT USE OF INSULIN (H): Primary | ICD-10-CM

## 2023-11-21 DIAGNOSIS — Z3A.10 10 WEEKS GESTATION OF PREGNANCY: ICD-10-CM

## 2023-11-21 LAB — TSH SERPL DL<=0.005 MIU/L-ACNC: 1.65 UIU/ML (ref 0.3–4.2)

## 2023-11-21 PROCEDURE — 99244 OFF/OP CNSLTJ NEW/EST MOD 40: CPT | Mod: VID | Performed by: INTERNAL MEDICINE

## 2023-11-21 RX ORDER — GLUCOSAMINE HCL/CHONDROITIN SU 500-400 MG
CAPSULE ORAL
Qty: 100 EACH | Refills: 3 | Status: SHIPPED | OUTPATIENT
Start: 2023-11-21 | End: 2024-03-14

## 2023-11-21 RX ORDER — LANCETS
EACH MISCELLANEOUS
Qty: 100 EACH | Refills: 6 | Status: SHIPPED | OUTPATIENT
Start: 2023-11-21 | End: 2024-03-14

## 2023-11-21 RX ORDER — METHYLPREDNISOLONE SODIUM SUCCINATE 125 MG/2ML
INJECTION, POWDER, FOR SOLUTION INTRAMUSCULAR; INTRAVENOUS
Qty: 100 EACH | Refills: 3 | Status: SHIPPED | OUTPATIENT
Start: 2023-11-21 | End: 2024-03-14

## 2023-11-21 ASSESSMENT — PAIN SCALES - GENERAL: PAINLEVEL: NO PAIN (0)

## 2023-11-21 NOTE — PROGRESS NOTES
Endocrinology Clinic     Jo Ann Mckeon MRN:[id number] YOB: 1980  Primary care provider: Baltazar Knowles     Medical Decision Making:   Diabetes Mellitus Type 2 : Hemoglobin A1c-6.8% on 11/17/2023   Patient is currently G5, P3, 10 weeks, 2 days pregnant (approximate, as patient does not know her exact LMP). diagnosed with type 2 diabetes in this pregnancy.  Had gestational diabetes in previous 3 pregnancies, treated with NPH  insulin.  Currently not on any antidiabetic medications.  Have both fasting and 1 hour postprandial hyperglycemia.    Plan  -Start on NPH 10 units before supper daily. (Currently does not have any insurance, so chosen NPH). she does not know how to inject insulin.  Has appointment with diabetic educator on 11/20/2023.  Diabetic educator will teach her how to inject insulin and then she will start it.  -Discussed about checking blood glucose fasting, 1 hour postprandial.  Goal blood glucose in pregnancy, fasting-less than 90 and 1 hour postprandial less than 140 mg/dL.  Discussed with her regarding pathophysiology of diabetes in pregnancy-.  Discussed needs of insulin increase as pregnancy progresses due to insulin resistance caused by placental hormones.  -Advised to continue follow-up with diabetic educator.  Call with any questions.      DM Screening Needed Today  -Encouraged to schedule eye appointment for diabetic retinopathy examination.      No follow-ups on file.    History of Present Illness:   Jo Ann Mckeon a 43 year old is here for the first time evaluation of type 2 diabetes mellitus.    DM Concerns:  Patient is a 43-year-old female who was seen by virtual visit today.  She is currently pregnant, this is her fourth pregnancy, 1 ectopic in the past.  Reports she had gestational diabetes mellitus and other 3 pregnancies and was treated with insulin and all of them.  Reports this is the first time she is here and she has type of 2 diabetes.  She is currently not on any  medication for diabetes.  Reported after her third pregnancy she was diagnosed with prediabetes and was advised to take metformin but she lost her insurance and has not followed up with any provider for the last 3 years or taking any antidiabetic medication.  Had meeting with diabetes educator yesterday.    A1c:  Lab Results   Component Value Date    A1C 6.8 2023       Weight:  Wt Readings from Last 2 Encounters:   10/16/23 87.5 kg (193 lb)   23 85.7 kg (189 lb)       She is being treated with:  -None    She eats 3 meals daily.  Breakfast-from 7 to 8 AM, include eggs, flat bread, coffee or milk  Lunch-1 PM eats chicken sandwich, beef, flat bread  Dinner-6 to 7 PM-same as lunch    Glucose Monitoring Information:  Patient monitors blood sugars at home using glucometer, recently started checking blood glucose.      23  Morning fastin     23  Morning fastin     23  Morning fastin  1 hour after lunch: 161  2 hours after lunch: 137    23  Morning fastin    Interpretation: Has both fasting and 1 hour post meal hyperglycemia from the readings available.    History of diabetes:    Diagnosed during this pregnancy.  Had history of gestational diabetes and previous 3 pregnancies.  Was diagnosed with prediabetes after her last pregnancy and was advised to start on metformin but she was not able to continue care due to loss of insurance.  Previous gestational diabetes were treated with one-time insulin.    Diabetes Complication Screening:  -Hypoglycemia: .  Denies any previous hypoglycemic episode.  Unsure about hypoglycemia awareness.  -Retinopathy: No eye exams recently.  -Nephropathy: Creatinine normal, no microalbumin to creatinine ratio on file..   Lab Results   Component Value Date    CR 0.64 2023    CR 0.81 2018    CR 0.46 2013       -Neuropathy: Denies any neuropathy symptoms.  -BP: No diagnosis of hypertension.  -ASCVD: None    ROS:  All 12  systems were reviewed and negative except as mentioned in HPI    Past Medical/Surgical History:  Past Medical History:   Diagnosis Date    Type 2 diabetes mellitus (H)      Past Surgical History:   Procedure Laterality Date    APPENDECTOMY         Allergies:  No Known Allergies    Current meds:   Current Outpatient Medications   Medication    Prenatal Vit-Fe Fumarate-FA (PRENATAL MULTIVITAMIN W/IRON) 27-0.8 MG tablet     No current facility-administered medications for this visit.       Family History:  No family history on file.    Social History:  Social History     Tobacco Use    Smoking status: Never    Smokeless tobacco: Never   Substance Use Topics    Alcohol use: Not Currently         Pertinent Physical Exam:   LMP 09/10/2023 (Approximate)             Discussed with Attending  Pato Gong MD  Endocrine Fellow  Pager # [phone]    Attestation    I have seen patient and discussed management plan with the patient on November 21, 2023.   I have discussed management plan with Endocrinology Fellow and agree with the assessment and plan of care as documented below.        Patient Instructions     Goals in pregnancy include testing before and after each meal.  PLEASE CHECK BLOOD GLUCOSE USING GLUCOMETER. Target glucose includes fasting blood sugars 70-95 mg/dL, 1 hour post-meal values <110-140 mg/dL and 2 hour post-meal values 100-120 mg/dL. We aim to keep glucose values as close to these targets as possible.  Please reach out to your diabetes care team if you have more than 2-3 readings in a week over the above targets or less than 60 mg/dL.  Please schedule weekly appointments with your provider (MD/PA/NP) or diabetes educator throughout your pregnancy.  Please schedule an eye exam, as pregnancy can result in progression of retinopathy.       If you have concerns, please send a [employer] message, call the clinic at [phone], or call [phone] after hours/weekends and ask to speak with the  endocrinologist on call.       Start NPH insulin 10 units with supper once you have instructions from diabetes educator on injection techniques.         Kurt Harris MD  Staff Endocrinologist       Video-Visit Details    Type of service:  Video Visit with the provider.   Joined the call at 11/21/2023, 10:13:56 am.  Left the call at 11/21/2023, 10:27:25 am.  You were on the call for 13 minutes 28 seconds .    Distant Location (provider location):  Off-site.     Platform used for Video Visit: Wenwo

## 2023-11-21 NOTE — TELEPHONE ENCOUNTER
Spoke with pt. She is aware she is going to be starting insulin. Offered in person appt to review injection technique. Pt states it would be difficult to make an in person appt. She has used insulin in the past but notes it was a while ago so she doesn't remember well. Reviewed injection technique with pt over the phone and  pt will also ask the pharmacist for demonstration/instructions.     Pt has follow up on Friday with CDE. Advised pt to call with any concerns prior.     Will need to find [employer] voucher for insulin and send to pharmacy as pt does not have active insurance.

## 2023-11-21 NOTE — TELEPHONE ENCOUNTER
Called [facility] and provided voucher:     [id number]   RXBIN # - [id number]      PCN # - [id number]      Group # [id number]    Pharmacist states it went through successfully for $0 and they will get Rx ready for pt.

## 2023-11-21 NOTE — PATIENT INSTRUCTIONS
Goals in pregnancy include testing before and after each meal.  PLEASE CHECK BLOOD GLUCOSE USING GLUCOMETER. Target glucose includes fasting blood sugars 70-95 mg/dL, 1 hour post-meal values <110-140 mg/dL and 2 hour post-meal values 100-120 mg/dL. We aim to keep glucose values as close to these targets as possible.  Please reach out to your diabetes care team if you have more than 2-3 readings in a week over the above targets or less than 60 mg/dL.  Please schedule weekly appointments with your provider (MD/PA/NP) or diabetes educator throughout your pregnancy.  Please schedule an eye exam, as pregnancy can result in progression of retinopathy.       If you have concerns, please send a [employer] message, call the clinic at [phone], or call [phone] after hours/weekends and ask to speak with the endocrinologist on call.       Start NPH insulin 10 units with supper once you have instructions from diabetes educator on injection techniques.

## 2023-11-21 NOTE — LETTER
2023       RE: Jo Ann Mckeon   [place] [facility] [address]     Dear Colleague,    Thank you for referring your patient, Jo Ann Mckeon, to the [facility] [place] [facility]. Please see a copy of my visit note below.    Outcome for 23 2:38 PM: Reached patient but requests call back at 10am on 23  Glory Mistry MA  Outcome for 23 1:09 PM: Left Voicemail   Glory Mistry MA  Outcome for 23 4:13 PM: Data obtained via phone and located below  Glory Mistry MA    23  Morning fastin    23  Morning fastin    23  Morning fastin  1 hour after lunch: 161  2 hours after lunch: 137      Endocrinology Clinic     Jo Ann Mckeon MRN:[id number] YOB: 1980  Primary care provider: Baltazar Knowles     Medical Decision Making:   Diabetes Mellitus Type 2 : Hemoglobin A1c-6.8% on 2023   Patient is currently G5, P3, 10 weeks, 2 days pregnant (approximate, as patient does not know her exact LMP). diagnosed with type 2 diabetes in this pregnancy.  Had gestational diabetes in previous 3 pregnancies, treated with NPH  insulin.  Currently not on any antidiabetic medications.  Have both fasting and 1 hour postprandial hyperglycemia.    Plan  -Start on NPH 10 units before supper daily. (Currently does not have any insurance, so chosen NPH). she does not know how to inject insulin.  Has appointment with diabetic educator on 2023.  Diabetic educator will teach her how to inject insulin and then she will start it.  -Discussed about checking blood glucose fasting, 1 hour postprandial.  Goal blood glucose in pregnancy, fasting-less than 90 and 1 hour postprandial less than 140 mg/dL.  Discussed with her regarding pathophysiology of diabetes in pregnancy-.  Discussed needs of insulin increase as pregnancy progresses due to insulin resistance caused by placental  hormones.  -Advised to continue follow-up with diabetic educator.  Call with any questions.      DM Screening Needed Today  -Encouraged to schedule eye appointment for diabetic retinopathy examination.      No follow-ups on file.    History of Present Illness:   Jo Ann Mckeon a 43 year old is here for the first time evaluation of type 2 diabetes mellitus.    DM Concerns:  Patient is a 43-year-old female who was seen by virtual visit today.  She is currently pregnant, this is her fourth pregnancy, 1 ectopic in the past.  Reports she had gestational diabetes mellitus and other 3 pregnancies and was treated with insulin and all of them.  Reports this is the first time she is here and she has type of 2 diabetes.  She is currently not on any medication for diabetes.  Reported after her third pregnancy she was diagnosed with prediabetes and was advised to take metformin but she lost her insurance and has not followed up with any provider for the last 3 years or taking any antidiabetic medication.  Had meeting with diabetes educator yesterday.    A1c:  Lab Results   Component Value Date    A1C 6.8 2023       Weight:  Wt Readings from Last 2 Encounters:   10/16/23 87.5 kg (193 lb)   23 85.7 kg (189 lb)       She is being treated with:  -None    She eats 3 meals daily.  Breakfast-from 7 to 8 AM, include eggs, flat bread, coffee or milk  Lunch-1 PM eats chicken sandwich, beef, flat bread  Dinner-6 to 7 PM-same as lunch    Glucose Monitoring Information:  Patient monitors blood sugars at home using glucometer, recently started checking blood glucose.      23  Morning fastin     23  Morning fastin     23  Morning fastin  1 hour after lunch: 161  2 hours after lunch: 137    23  Morning fastin    Interpretation: Has both fasting and 1 hour post meal hyperglycemia from the readings available.    History of diabetes:    Diagnosed during this pregnancy.  Had history of  gestational diabetes and previous 3 pregnancies.  Was diagnosed with prediabetes after her last pregnancy and was advised to start on metformin but she was not able to continue care due to loss of insurance.  Previous gestational diabetes were treated with one-time insulin.    Diabetes Complication Screening:  -Hypoglycemia: .  Denies any previous hypoglycemic episode.  Unsure about hypoglycemia awareness.  -Retinopathy: No eye exams recently.  -Nephropathy: Creatinine normal, no microalbumin to creatinine ratio on file..   Lab Results   Component Value Date    CR 0.64 06/27/2023    CR 0.81 12/29/2018    CR 0.46 02/26/2013       -Neuropathy: Denies any neuropathy symptoms.  -BP: No diagnosis of hypertension.  -ASCVD: None    ROS:  All 12 systems were reviewed and negative except as mentioned in HPI    Past Medical/Surgical History:  Past Medical History:   Diagnosis Date    Type 2 diabetes mellitus (H)      Past Surgical History:   Procedure Laterality Date    APPENDECTOMY         Allergies:  No Known Allergies    Current meds:   Current Outpatient Medications   Medication    Prenatal Vit-Fe Fumarate-FA (PRENATAL MULTIVITAMIN W/IRON) 27-0.8 MG tablet     No current facility-administered medications for this visit.       Family History:  No family history on file.    Social History:  Social History     Tobacco Use    Smoking status: Never    Smokeless tobacco: Never   Substance Use Topics    Alcohol use: Not Currently         Pertinent Physical Exam:   LMP 09/10/2023 (Approximate)             Discussed with Attending  Pato Gong MD  Endocrine Fellow  Pager # [phone]    Attestation    I have seen patient and discussed management plan with the patient on November 21, 2023.   I have discussed management plan with Endocrinology Fellow and agree with the assessment and plan of care as documented below.        Patient Instructions     Goals in pregnancy include testing before and after each meal.  PLEASE CHECK  BLOOD GLUCOSE USING GLUCOMETER. Target glucose includes fasting blood sugars 70-95 mg/dL, 1 hour post-meal values <110-140 mg/dL and 2 hour post-meal values 100-120 mg/dL. We aim to keep glucose values as close to these targets as possible.  Please reach out to your diabetes care team if you have more than 2-3 readings in a week over the above targets or less than 60 mg/dL.  Please schedule weekly appointments with your provider (MD/PA/NP) or diabetes educator throughout your pregnancy.  Please schedule an eye exam, as pregnancy can result in progression of retinopathy.       If you have concerns, please send a [employer] message, call the clinic at [phone], or call [phone] after hours/weekends and ask to speak with the endocrinologist on call.       Start NPH insulin 10 units with supper once you have instructions from diabetes educator on injection techniques.         Kurt Harris MD  Staff Endocrinologist       Video-Visit Details    Type of service:  Video Visit with the provider.   Joined the call at 11/21/2023, 10:13:56 am.  Left the call at 11/21/2023, 10:27:25 am.  You were on the call for 13 minutes 28 seconds .    Distant Location (provider location):  Off-site.     Platform used for Video Visit: saambaa

## 2023-11-21 NOTE — NURSING NOTE
Is the patient currently in the state of [place]? YES    Visit mode:VIDEO    If the visit is dropped, the patient can be reconnected by: VIDEO VISIT: Text to cell phone:   Telephone Information:   Mobile [phone]       Will anyone else be joining the visit? NO  (If patient encounters technical issues they should call [phone])    How would you like to obtain your AVS? MyChart    Are changes needed to the allergy or medication list? Pt stated no changes to allergies and Pt stated no med changes    Reason for visit: Consult    Princess Weber LPN LPN

## 2023-11-21 NOTE — PROGRESS NOTES
Clinic Care Coordination Contact  Community Health Worker Initial Outreach    CHW Initial Information Gathering:  Current living arrangement:: I live in a private home with family  Type of residence::  (Private home)  Community Resources: None  Transportation means:: Regular car       Patient accepts CC: Yes. Patient scheduled for assessment with CC SW on 11/28/2023 at 10:00 AM. Patient noted desire to discuss Health Insurance and obtaining WIC.     CHW introduced self/care coordination and intent of call.         Agata STUBBS [facility]  Community Health Worker  [facility] Clinics:  [facility] [place]   Clinic Care Coordination  [phone]

## 2023-11-22 ENCOUNTER — TELEPHONE (OUTPATIENT)
Dept: EDUCATION SERVICES | Facility: CLINIC | Age: 43
End: 2023-11-22

## 2023-11-22 ENCOUNTER — TELEPHONE (OUTPATIENT)
Dept: OBGYN | Facility: CLINIC | Age: 43
End: 2023-11-22

## 2023-11-22 NOTE — TELEPHONE ENCOUNTER
10w3d    Pt calls with light spotting. Some mild cramping.     Discussed possible outcomes. Viable vs miscarriage.   Advised on when to go to ER.    Advised to rest, avoid intercourse for now. call if she has questions.    Octavia GARRETT RN BSN

## 2023-11-22 NOTE — TELEPHONE ENCOUNTER
Per communication with Dr. Harris, NPH insulin to be taken at HS (bedtime).  Called and spoke with patient.  Instructed to take NPH at HS.  Reports her bedtime varies.  Recommended she take NPH at each 10 PM. Patient stated understanding and agreement.   Dorothy Garner, MPH, RD, CDCES, LD 11/22/2023

## 2023-11-24 ENCOUNTER — PRENATAL OFFICE VISIT (OUTPATIENT)
Dept: OBGYN | Facility: CLINIC | Age: 43
End: 2023-11-24

## 2023-11-24 ENCOUNTER — ANCILLARY PROCEDURE (OUTPATIENT)
Dept: ULTRASOUND IMAGING | Facility: CLINIC | Age: 43
End: 2023-11-24

## 2023-11-24 ENCOUNTER — TELEPHONE (OUTPATIENT)
Dept: OBGYN | Facility: CLINIC | Age: 43
End: 2023-11-24

## 2023-11-24 ENCOUNTER — VIRTUAL VISIT (OUTPATIENT)
Dept: EDUCATION SERVICES | Facility: CLINIC | Age: 43
End: 2023-11-24

## 2023-11-24 VITALS — WEIGHT: 193 LBS | SYSTOLIC BLOOD PRESSURE: 114 MMHG | DIASTOLIC BLOOD PRESSURE: 70 MMHG | BODY MASS INDEX: 31.15 KG/M2

## 2023-11-24 DIAGNOSIS — E11.9 TYPE 2 DIABETES MELLITUS WITHOUT COMPLICATION, WITHOUT LONG-TERM CURRENT USE OF INSULIN (H): ICD-10-CM

## 2023-11-24 DIAGNOSIS — O09.529 HIGH-RISK PREGNANCY, ELDERLY MULTIGRAVIDA, UNSPECIFIED TRIMESTER: ICD-10-CM

## 2023-11-24 DIAGNOSIS — O02.1 MISSED ABORTION: Primary | ICD-10-CM

## 2023-11-24 DIAGNOSIS — O24.119 TYPE 2 DIABETES MELLITUS AFFECTING PREGNANCY, ANTEPARTUM: Primary | ICD-10-CM

## 2023-11-24 PROCEDURE — G0108 DIAB MANAGE TRN  PER INDIV: HCPCS | Mod: 95 | Performed by: DIETITIAN, REGISTERED

## 2023-11-24 PROCEDURE — 76817 TRANSVAGINAL US OBSTETRIC: CPT | Performed by: OBSTETRICS & GYNECOLOGY

## 2023-11-24 PROCEDURE — S0190 MIFEPRISTONE, ORAL, 200 MG: HCPCS | Performed by: OBSTETRICS & GYNECOLOGY

## 2023-11-24 PROCEDURE — 76801 OB US < 14 WKS SINGLE FETUS: CPT | Performed by: OBSTETRICS & GYNECOLOGY

## 2023-11-24 PROCEDURE — 99203 OFFICE O/P NEW LOW 30 MIN: CPT | Performed by: OBSTETRICS & GYNECOLOGY

## 2023-11-24 RX ORDER — MISOPROSTOL 200 UG/1
800 TABLET ORAL AT BEDTIME
Qty: 4 TABLET | Refills: 1 | Status: SHIPPED | OUTPATIENT
Start: 2023-11-24 | End: 2024-02-23

## 2023-11-24 RX ORDER — MIFEPRISTONE 200 MG/1
200 TABLET ORAL ONCE
Status: COMPLETED | OUTPATIENT
Start: 2023-11-24 | End: 2023-11-24

## 2023-11-24 RX ADMIN — MIFEPRISTONE 200 MG: 200 TABLET ORAL at 11:54

## 2023-11-24 NOTE — TELEPHONE ENCOUNTER
I had attempted to schedule the follow up ultrasound in the [place] area for 12/1 or 12/4.    They do not have the appointment time she was requesting.  However the [facility] office does have openings.    I tried calling the patient to help her schedule at  but NA x 2 on patient # and I Left message on voice mail of spouses mobile #.   Katelyn Wilkes CMA

## 2023-11-24 NOTE — PROGRESS NOTES
Diabetes and Pregnancy Follow-up  Type of Service: Telephone Visit/ 30 minutes     Originating Location (Patient Location): Home  Distant Location (Provider Location): [facility] [place] [place]  Mode of Communication:  Telephone     Telephone Visit Start Time: 7:37 AM  Telephone Visit End Time (telephone visit stop time): 8:37 AM     How would patient like to obtain AVS? not needed     Subjective/Objective:    Jo Ann Mckeon was called for a scheduled BG review. Last date of communication was: 11/21/23.    Type 2 diabetes in pregnancy is being managed with diet and medications    Taking diabetes medications: yes:     Diabetes Medication(s)       Insulin       insulin  UNIT/ML injection    Inject 10 Units Subcutaneous at bedtime Took first dose on 11/23/23            Estimated Date of Delivery: Jun 16, 2024    Blood Glucose/Ketone Log:    Date Ketones Fasting Post Breakfast Post Lunch Post Supper   11/24  130      11/23  126 137     11/22  132 140 something  130 something   11/21  130 something      (Testing 1 hour post meals)      Assessment:  Ketones: not yet checking.   Fasting blood glucoses: 0% in target.  After breakfast: 50% in target.  Before lunch: n/a% in target.  After lunch: n/a% in target.  Before dinner: n/a% in target.  After dinner: 100% in target (only 1 value).    Reviewed use of the insulin pen.  Discussed insulin injection sites with an emphasis on site rotation. Discussed insulin pen storage.  Instructed patient to start a new NPH Humulin pen every 14 days.  Does not care for peanut butter so we discussed options for HS snacks which pair carbohydrate and protein.  Instructed patient on urine ketone monitoring and a prescription for urine ketone strips was electronically sent to the pharmacy.    Plan/Response:  Meal Plan Recommendation: 30 carbs at breakfast, 45-60 carbs at lunch, 45-60 carbs at supper, 15-30 carbs at each of 3 snacks between meals    Continue to check BG 4 times daily (fasting and  one hour(s) after each meal).  Write down blood sugars    Check ketones every morning.    Continue NPH as 10 units/day (HS).  IF fasting glucoses remain elevated above 95 mg/dL, on Sunday, 11/26/23, increase NPH to 12 units/day (HS).    Follow-up on Monday - scheduled with Tabatha Garner, MPH, RD, CDCES, LD 11/24/2023    Time Spent: 30 minutes    Any diabetes medication dose changes were made via the CDE Protocol and Collaborative Practice Agreement with the patient's referring provider and endocrinology provider. A copy of this encounter was shared with the provider.

## 2023-11-24 NOTE — LETTER
11/24/2023         RE: Jo Ann Mckeon  [address]  [place] [address]        Dear Colleague,    Thank you for referring your patient, Jo Ann Mckeon, to the [facility]. Please see a copy of my visit note below.    Diabetes and Pregnancy Follow-up  Type of Service: Telephone Visit/ 30 minutes     Originating Location (Patient Location): Home  Distant Location (Provider Location): [place] [facility]  Mode of Communication:  Telephone     Telephone Visit Start Time: 7:37 AM  Telephone Visit End Time (telephone visit stop time): 8:37 AM     How would patient like to obtain AVS? not needed     Subjective/Objective:    Jo Ann Mckeon was called for a scheduled BG review. Last date of communication was: 11/21/23.    Type 2 diabetes in pregnancy is being managed with diet and medications    Taking diabetes medications: yes:     Diabetes Medication(s)       Insulin       insulin  UNIT/ML injection    Inject 10 Units Subcutaneous at bedtime Took first dose on 11/23/23            Estimated Date of Delivery: Jun 16, 2024    Blood Glucose/Ketone Log:    Date Ketones Fasting Post Breakfast Post Lunch Post Supper   11/24  130      11/23  126 137     11/22  132 140 something  130 something   11/21  130 something      (Testing 1 hour post meals)      Assessment:  Ketones: not yet checking.   Fasting blood glucoses: 0% in target.  After breakfast: 50% in target.  Before lunch: n/a% in target.  After lunch: n/a% in target.  Before dinner: n/a% in target.  After dinner: 100% in target (only 1 value).    Reviewed use of the insulin pen.  Discussed insulin injection sites with an emphasis on site rotation. Discussed insulin pen storage.  Instructed patient to start a new NPH Humulin pen every 14 days.  Does not care for peanut butter so we discussed options for HS snacks which pair carbohydrate and protein.  Instructed patient on urine ketone monitoring and a prescription for urine ketone strips was  electronically sent to the pharmacy.    Plan/Response:  Meal Plan Recommendation: 30 carbs at breakfast, 45-60 carbs at lunch, 45-60 carbs at supper, 15-30 carbs at each of 3 snacks between meals    Continue to check BG 4 times daily (fasting and one hour(s) after each meal).  Write down blood sugars    Check ketones every morning.    Continue NPH as 10 units/day (HS).  IF fasting glucoses remain elevated above 95 mg/dL, on Sunday, 11/26/23, increase NPH to 12 units/day (HS).    Follow-up on Monday - scheduled with Tabatha Garner, MPH, RD, CDCES, LD 11/24/2023    Time Spent: 30 minutes    Any diabetes medication dose changes were made via the CDE Protocol and Collaborative Practice Agreement with the patient's referring provider and endocrinology provider. A copy of this encounter was shared with the provider.

## 2023-11-24 NOTE — PATIENT INSTRUCTIONS
MEDICATION TERMINATION OF PREGNANCY USING MIFEPRISTONE AND MISOPROSTOL    HOW DOES IT WORK?    Mifepristone and misoprostol together are medications that can be taken to end your pregnancy.      HOW WELL DOES THE MEDICATION WORK?    Medication  is highly effective. Medication  is highly effective; it has a success rate of >95% using the standard protocol. Medication  is safe. Serious complications requiring hospitalization for infection treatment or transfusion occur in <0.4% of patients under the standard protocol?. Failed or incomplete medication  may require a suction procedure to complete.    WHAT DO I DO?    You took one tablet of 200 mg mifepristone today during your visit or will take it at home as directed by your provider.   After taking the mifepristone, use the misoprostol.  There are two ways to take misoprostol: vaginal or buccal (between cheek and gum in your mouth).   Vaginal Use of Misoprostol (may be inserted immediately or up to 48 hrs after mifepristone)   Wash your hands and lie down. Place the 4 misoprostol tablets one at a time up into the vagina as far as you can using your finger. It doesn t matter where in the vagina you put the pills. Each misoprostol pill contains 200 micrograms.    If your provider has recommended a second dose of misoprostol, repeat the process 4 hours later with an additional 4 tablets of misoprostol. If you have started to bleed, you can put the pills in your cheeks (between your cheek and your gums), instead of your vagina, for 30 minutes. See  Buccal Use of Misoprostol  below.    Buccal Use of Misoprostol (should be taken between 24-48 hrs after mifepristone)   Place 2 tablets of misoprostol in each cheek (between your cheek and your gums), four tablets total.    Leave them in your cheeks for 30 minutes to dissolve.    After 30 minutes swallow the pills with a large glass of water.   If your provider has recommended a second dose of  misoprostol, repeat steps i. through iii. above after 4 hours.    You may take ibuprofen (800 mg total) and/or acetaminophen (650mg) by mouth one hour before using the misoprostol. This may help with cramping. See further information on pain management below.       WHAT HAPPENS NEXT?   Vaginal bleeding with clots is an expected part of this process. The cramps and bleeding may be stronger than your normal period. The cramps and heavy bleeding usually start 2 to 4 hours after you use the misoprostol pills. This heavy bleeding means the pills are working. After the pregnancy tissue passes, the bleeding will slow down and get lighter and lighter. It is normal to pass small clots and sometimes the bleeding may seem to increase when you get up suddenly or go to the toilet. Bleeding may continue on and off for up to 4 weeks.     Lower abdominal cramping pain, especially in the middle of your lower abdomen can occur any time after you take the misoprostol.? Cramping may be similar or sometimes much greater than with a menstrual period and can last for a couple of days. If you continue to have pain and cramps after taking the ibuprofen (as directed above), then you can use additional pain medicine such as acetaminophen (Tylenol).?   Nausea, diarrhea: These symptoms should get better a few hours after using the pills and should not last longer than 24 hours.    Fever and chills: You may have fever and chills the day you take the misoprostol. It is NOT normal to have a fever after that. Call us right away if you do. It could be a sign that you are getting an infection.   You will receive a phone call from a clinic nurse within a week of your visit.    You can expect a period in 4 to 8 weeks after using mifepristone and misoprostol but this varies quite a bit depending upon a person s normal menstrual cycle.      WHAT CAN I TAKE FOR PAIN, NAUSEA, DIARRHEA?    Pain:    Take ibuprofen (Motrin or Advil) 800 mg every 8 hours as  needed for pain. Take this with food to avoid an upset stomach. You may also alternate this with acetaminophen (Tylenol) 650mg every 6 hours for added pain control.   Comfort: A hot pack may help with cramps. You can also drink some hot tea. Rest in a soothing place.    Nausea   Take ondansetron or promethazine as needed for nausea and vomiting as needed for nausea and vomiting if prescribed.   Diarrhea   Take Loperamide as needed for diarrhea. Take 2 capsules after the first loose bowel movement. Can take 1 capsule after each additional loose stool. Do not take more than 8 capsules in a day.     WHEN SHOULD I CALL MY HEALTHCARE PROVIDER?    Your bleeding soaks through more than 2 maxi pads per hour for 2 hours in a row   You do not bleed within 24 hours after taking the misoprostol   You continue to feel sick more than 24 hours after taking the misoprostol:   Fever on an oral thermometer of 100.4 degrees Fahrenheit, severe stomach area (abdominal) pain, weakness, nausea, vomiting, or diarrhea     The clinic phone is answered 24 hours per day. If it is after clinic hours, leave a message with the answering service and a health care provider will call you back. Please call if you have any questions, think something is going wrong, or think you have an emergency. If you do not receive a call back within an hour, go to the nearest emergency room.              FEELINGS AFTER ENDING PREGNANCY    People have many different feelings after using the medications to end a pregnancy. The most common emotion people report is relief, but people can also feel many other emotions. If you think your emotions are not what they should be, please talk to us.        References:   American College of Obstetricians and Gynecologists  Committee on Practice Bulletins--Gynecology, Society of Family Planning. Medication  Up to 70 Days of Gestation: ACOG Practice Bulletin, Number 225. Obstet Gynecol. 2020;136(4):e31-e47. doi:  10.1097/AOG.[id number].    Reproductive Health Access Project. Medication  Aftercare Instructions (vaginal miso). May 2022. Available at: https://[email or web address].org/wp-content/uploads/[email or web address]-[email or web address]   Reproductive Health Access Project. Medication  Aftercare Instructions (buccal miso). May 2022. Available at: https://www.[email or web address].org/wp-content/uploads/-english-[email or web address]-med-ab-aftercare_final.[email or web address]

## 2023-11-24 NOTE — NURSING NOTE
"Chief Complaint   Patient presents with    Prenatal Care     NPN provider visit   10w5d  Discuss ultrasound results from today    initial /70   Wt 87.5 kg (193 lb)   LMP 09/10/2023 (Approximate)   BMI 31.15 kg/m   Estimated body mass index is 31.15 kg/m  as calculated from the following:    Height as of 6/27/23: 1.676 m (5' 6\").    Weight as of this encounter: 87.5 kg (193 lb).  BP completed using cuff size large.  Katelyn Wilkes CMA    "

## 2023-11-24 NOTE — PROGRESS NOTES
2023           Subjective:  Jo Ann Mckeon is a 43 year old  at 10w5d here today for medication  for Missed  Dx'd by 2nd U/S today.  Patient has been counseled on pregnancy options and desires medical termination of pregnancy.       Objective:  Dating:  Patient's last menstrual period was 09/10/2023 (approximate).  U/S today:  Results for orders placed or performed in visit on 23   US OB <14 Weeks w Transvaginal Single    Narrative    [facility]  ULTRASOUND - OB < 14 Weeks- Transabdominal and Transvaginal     Referring Provider: Baltazar Knowles MD     ====================================  INDICATIONS FOR ULTRASOUND:  OB History: 1st trimester loss (miscarriage, ectopic)  Present Conditions: Advance Maternal Age (35+)  Initial S&D (0-17 weeks)     CLINICAL INFORMATION     LMP: 09/10/2023 - sure EDC: 2024   EGA: 10 w 5 d                         Impression                ===================     MEASUREMENTS  Gest Sac: vis        Position:nl    Contour:smooth/regular.   Yolk sac: NV   CRL: 0.89 cm.      EGA:  6w 6d    U/S EDC: 2024 discrepancy  Cardiac Activity:No     FHR: 0 bpm      Rt Ov L: 2.43 x 1.55 x 1.55 cm  Lt Ov L: NV  Cul de sac: no free fluid     *Other Findings:   Technique: Transvaginal Imaging performed  Transabdominal Imaging performed     ======================================  Impression:    Early obstetric transabdominal and transvaginal ultrasound.   Single intrauterine pregnancy without fetal cardiac activity measuring   8.9mm.   Discordant sonographic and menstrual EGA and EDC.     This is diagnostic of pregnancy failure.    Pt meeting with OB MD immediately following her US to discuss findings and   make a follow-up plan.    Veronika Ordaz MD      Note: federal law requires the release of results to patients even prior   to the ordering provider viewing the result. Your provider will notify   you, generally within 24 hours, of  "any critical results. If follow up is   necessary, you will be notified at that time. Normal results, and abnormal   but non-urgent results, will generally be addressed within 48-72 hours.              OB History    Para Term  AB Living   5 3 3 0 1 3   SAB IAB Ectopic Multiple Live Births   0 0 1 0 3      # Outcome Date GA Lbr Arthur/2nd Weight Sex Delivery Anes PTL Lv   5 Current            4 Term 10/24/17 39w2d 02:10 / 00:47 3.74 kg (8 lb 3.9 oz) M Vag-Vacuum EPI N ALIRIO      Name: MAKAYLA JOHN ALICIA      Apgar1: 8  Apgar5: 9   3 Term 16 39w3d  3.175 kg (7 lb) F Vag-Spont None  ALIRIO      Name: Yahaira      Apgar1: 9  Apgar5: 9   2 Ectopic 03/24/15              Birth Comments: methotrexate given 3/24/15   1 Term 13 39w2d  3.645 kg (8 lb 0.6 oz) M Vag-Vacuum EPI  ALIRIO      Name: Taylor      Apgar1: 8  Apgar5: 9        Past Medical History:   Diagnosis Date    Type 2 diabetes mellitus (H)        Past Surgical History:   Procedure Laterality Date    APPENDECTOMY         Current Outpatient Medications   Medication    acetone urine (KETOSTIX) test strip    alcohol swab prep pads    blood glucose (NO BRAND SPECIFIED) test strip    blood glucose monitoring (NO BRAND SPECIFIED) meter device kit    insulin  UNIT/ML injection    insulin pen needle (ULTICARE SHORT) 31G X 8 MM miscellaneous    Prenatal Vit-Fe Fumarate-FA (PRENATAL MULTIVITAMIN W/IRON) 27-0.8 MG tablet    thin (NO BRAND SPECIFIED) lancets     No current facility-administered medications for this visit.       No Known Allergies    Social History     Tobacco Use    Smoking status: Never    Smokeless tobacco: Never   Vaping Use    Vaping Use: Never used   Substance Use Topics    Alcohol use: Not Currently    Drug use: Never        There were no vitals filed for this visit.    Estimated body mass index is 31.15 kg/m  as calculated from the following:    Height as of 23: 1.676 m (5' 6\").    Weight as of 10/16/23: 87.5 kg (193 lb). "     Gen: well-appearing, cooperative, well-groomed      Assessment and Plan:  Jo Ann Mckeon is a 43 year old  at 10w5d as dated by Previous US who desires termination of pregnancy with medication .     Based the pre-medication  ultrasound assessment, ultrasound prior to  IS NOT indicated.     The patient is appropriate for medication  with:  mifepristone and misoprostol.     Prior to the administration/prescribing of mifepristone, the patient received the medication guide and signed the patient agreement.      Mifepristone administered in clinic.  I personally observed the patient taking the Mifepristone.     Patient plans to take misoprostol by vaginal route.       Counseled patient on   - expected bleeding: Bleeding typically starts 2-4 hours after misoprostol and can be heavy for up to 2 hours. Once pregnancy tissue passes, bleeding should slow down to menstrual type flow but can last up to 2 weeks. Patient counseled to contact our clinic or present to Emergency Department in the case of heavy bleeding (soaking more than two maxi pads per hour for 2 consecutive hours).  - pain: Counseled patient that the most severe pain occurs approximately 2-4 hours after misoprostol use and lasts 1-2 hours. Advised patient to take ibuprofen 800 mg with misoprostol and repeat as needed every 8 hours. Patient may also alternate with acetaminophen for added pain control (acetaminophen 650 mg every 6 hours).   - potential side effects of misoprostol: nausea, vomiting, diarrhea, headache, dizziness, and thermoregulatory effects such as fever, warmth, hot flushes, or chills  - follow up plan options:   Home Pregnancy Test - Take a urine pregnancy test four weeks after taking  medication(s).  Blood Test - Get a blood test on the day of your appointment or the day you take your first dose of medication to measure the amount of pregnancy hormone (HCG) in your blood.  Get another HCG blood  test 1-2 weeks after taking  medication(s).   Ultrasound - Get a vaginal ultrasound 1-2 weeks after taking  medication(s).  Patient plans an ultrasound for follow up.  Also offered to discuss any contraceptive needs/plans with the patient today. Patient plans none.    Chart routed to RN team (TOP Triage [id number]) who will follow up with patient via telephone within 1 week after clinic visit to assess patient.    Review of the result(s) of each unique test - Pelvic U/S 2023 and today's U/S.          Baltazar Knowles MD

## 2023-11-24 NOTE — TELEPHONE ENCOUNTER
----- Message from Baltazar Knowles MD sent at 11/24/2023 11:30 AM CST -----  Regarding: Missed Ab, Mife/Miso Pt  This Pt took her Mifepristone for a Missed AB today (11/24) in clinic with me.  She will use vaginal Misoprostol the night of 11/25.  She will get an U/S next week.  Please contact Pt per protocol to check on her.    Thanks,    Dr. Knowles

## 2023-11-27 ENCOUNTER — VIRTUAL VISIT (OUTPATIENT)
Dept: EDUCATION SERVICES | Facility: CLINIC | Age: 43
End: 2023-11-27

## 2023-11-27 ENCOUNTER — TELEPHONE (OUTPATIENT)
Dept: EDUCATION SERVICES | Facility: CLINIC | Age: 43
End: 2023-11-27

## 2023-11-27 ENCOUNTER — TELEPHONE (OUTPATIENT)
Dept: ENDOCRINOLOGY | Facility: CLINIC | Age: 43
End: 2023-11-27

## 2023-11-27 DIAGNOSIS — E11.9 TYPE 2 DIABETES MELLITUS WITHOUT COMPLICATION, WITHOUT LONG-TERM CURRENT USE OF INSULIN (H): Primary | ICD-10-CM

## 2023-11-27 PROCEDURE — G0108 DIAB MANAGE TRN  PER INDIV: HCPCS | Mod: 95

## 2023-11-27 NOTE — LETTER
11/27/2023         RE: [place] [facility]  [id number] [facility] [address]        Dear Colleague,    Thank you for referring your patient, Jo Ann Mckeon, to the [facility]. Please see a copy of my visit note below.    Diabetes Self-Management Education & Support  Presents for: Follow-up    Type of Service: Telephone Visit    Originating Location (Patient Location): Home  Distant Location (Provider Location): Offsite  Mode of Communication:  Telephone    Telephone Visit Start Time:  1115  Telephone Visit End Time (telephone visit stop time): 1150    How would patient like to obtain AVS? Mail a copy    ASSESSMENT:  Jo Ann reports she has been taking NPH insulin at  since prescribed 11/21. BG as below. Discussed with Jo Ann her diagnosis of Type 2 diabetes and treatment options now that she is not pregnant, condolences given. Recommend she test her BG and review at follow up diabetes visit to determine if treatment options above and beyond diet and exercise are needed. She is receptive to this plan and has phone number for diabetes triage to call with any questions.     Patient's most recent   Lab Results   Component Value Date    A1C 6.8 11/17/2023     is meeting goal of <7.0    Diabetes knowledge and skills assessment:   Patient is knowledgeable in diabetes management concepts related to: Monitoring    Continue education with the following diabetes management concepts: Healthy Eating, Being Active, Taking Medication, Problem Solving, and Reducing Risks    Based on learning assessment above, most appropriate setting for further diabetes education would be: Individual setting.      PLAN  Sent a telephone encounter to OB provider regarding the fact that NPH insulin is not needed and can be discontinued from med list.  She will not need to test ketones.    Blood sugar monitoring:  Check blood sugars fasting and 2 hours after largest meal of the day  Fasting and before meal targets:   "mg/dL  2 hours after start of meal: <180 mg/dL  Keep a blood glucose record for next visit.    Meal Plan Recommendation:   Use portion control and plate planning method.  Carbohydrates to aim for at meals: 30-45 grams and snacks: 0-30 grams.    Follow up: December  See Care Plan for co-developed, patient-state behavior change goals.    SUBJECTIVE/OBJECTIVE:  Presents for: Follow-up  Accompanied by: Self  Diabetes education in the past 24mo: No  Focus of Visit: Monitoring, Healthy Eating, Taking Medication  Diabetes type: Type 2  Date of diagnosis: reports diagnosed with Type 2 diabetes this pregnancy  How confident are you filling out medical forms by yourself: Not Assessed  Cultural Influences/Ethnic Background:  Not  or     Diabetes Symptoms & Complications:  Complications assessed today?: No  Patient Problem List and Family Medical History reviewed for relevant medical history, current medical status, and diabetes risk factors.    Vitals:  LMP 09/10/2023 (Approximate)   Estimated body mass index is 31.15 kg/m  as calculated from the following:    Height as of 6/27/23: 1.676 m (5' 6\").    Weight as of 11/24/23: 87.5 kg (193 lb).   Last 3 BP:   BP Readings from Last 3 Encounters:   11/24/23 114/70   10/16/23 118/62   06/27/23 131/66       History   Smoking Status    Never   Smokeless Tobacco    Never       Labs:  Lab Results   Component Value Date    A1C 6.8 11/17/2023     Lab Results   Component Value Date     06/27/2023    GLC 95 12/29/2018     No results found for: \"LDL\"  No results found for: \"HDL\"]  GFR Estimate   Date Value Ref Range Status   06/27/2023 >90 >60 mL/min/1.73m2 Final   12/29/2018 >90 >60 mL/min/[1.73_m2] Final     Comment:     Non  GFR Calc  Starting 12/18/2018, serum creatinine based estimated GFR (eGFR) will be   calculated using the Chronic Kidney Disease Epidemiology Collaboration   (CKD-EPI) equation.       GFR Estimate If Black   Date Value Ref Range " "Status   12/29/2018 >90 >60 mL/min/[1.73_m2] Final     Comment:      GFR Calc  Starting 12/18/2018, serum creatinine based estimated GFR (eGFR) will be   calculated using the Chronic Kidney Disease Epidemiology Collaboration   (CKD-EPI) equation.       Lab Results   Component Value Date    CR 0.64 06/27/2023    CR 0.81 12/29/2018     No results found for: \"MICROALBUMIN\"    Healthy Eating:  Healthy Eating Assessed Today: Yes  Cultural/Jehovah's witness diet restrictions?: No  How many times a week on average do you eat food made away from home (restaurant/take-out)?:  (1-2)  Breakfast: 8-9:30 AM: 2 eggs with injera (1/2), coffee with milk  Lunch: 12-2 PM: injera with beef in sauce OR injera with lentils, ICE sparkling water OR Vitamin Water Zero  Dinner: 7-7:30 PM: last night = rice with vegetables and chicken, ICE sparkling water OR Vitamin Water Zero  Snacks: AM and/or PM: sometimes 1/2 liam, orange, banana, peanuts, OR roast wheat with peanuts; HS: last night = peanuts  Other: up for day at 5:30 AM (on school days); to bed at 11 PM  Has patient met with a dietitian in the past?: Yes    Being Active:  Being Active Assessed Today: Yes  Exercise:: Currently not exercising (ADLs, occasional walk outside)    Monitoring:  Monitoring Assessed Today: Yes  Did patient bring glucose meter to appointment? : Yes  Blood Glucose Meter: Accu-chek  Times checking blood sugar at home (number): 4 (1-2)  Times checking blood sugar at home (per): Day    Date Breakfast  Lunch  Dinner  Bedtime    Before After Before After Before After    11/27  139- 2 hours post        11/26 108 112- 1 hour  130  --    11/25 118 133  157  137      Taking Medications:  Diabetes Medication(s)       Insulin       insulin  UNIT/ML injection    Inject 10 Units Subcutaneous at bedtime          Taking Medication Assessed Today: No  Current Treatments: Diet    Problem Solving:  Problem Solving Assessed Today: Yes  Is the patient at risk for " hypoglycemia?: No    Reducing Risks:  Reducing Risks Assessed Today: Yes  Diabetes Risks: History of gestational diabetes, Ethnicity  Additional female risks: Gestational Diabetes (in 3 previous pregnancies)  CAD Risks: Diabetes Mellitus  Has dilated eye exam at least once a year?: No  Sees dentist every 6 months?: No (last 3-4 years ago)    Healthy Coping:  Healthy Coping Assessed Today: Yes  Emotional response to diabetes: Ready to learn  Informal Support system:: Spouse  Patient Activation Measure Survey Score:       No data to display              Care Plan and Education Provided:  Care Plan: Diabetes   Updates made by Glory Sosa since 11/27/2023 12:00 AM        Problem: Diabetes Self-Management Education Needed to Optimize Self-Care Behaviors         Goal: Healthy Eating - follow a healthy eating pattern for diabetes         Task: Provide education on portion control and consistency in amount, composition and timing of food intake Completed 11/27/2023   Responsible User: Francisca Garner RD        Goal: Monitoring - monitor glucose and ketones as directed    Recent Progress: 30%   Note:    Test glucose fasting and 2 hours after one meal, rotating after meals         Glory Sosa RDN, LD, [facility]    Time Spent: 30 minutes  Encounter Type: Individual    Any diabetes medication dose changes were made via the CDE Protocol per the patient's referring provider. A copy of this encounter was shared with the provider.

## 2023-11-27 NOTE — PROGRESS NOTES
Diabetes Self-Management Education & Support  Presents for: Follow-up    Type of Service: Telephone Visit    Originating Location (Patient Location): Home  Distant Location (Provider Location): Offsite  Mode of Communication:  Telephone    Telephone Visit Start Time:  1115  Telephone Visit End Time (telephone visit stop time): 1150    How would patient like to obtain AVS? Mail a copy    ASSESSMENT:  Jo Ann reports she has been taking NPH insulin at HS since prescribed 11/21. BG as below. Discussed with Jo Ann her diagnosis of Type 2 diabetes and treatment options now that she is not pregnant, condolences given. Recommend she test her BG and review at follow up diabetes visit to determine if treatment options above and beyond diet and exercise are needed. She is receptive to this plan and has phone number for diabetes triage to call with any questions.     Patient's most recent   Lab Results   Component Value Date    A1C 6.8 11/17/2023     is meeting goal of <7.0    Diabetes knowledge and skills assessment:   Patient is knowledgeable in diabetes management concepts related to: Monitoring    Continue education with the following diabetes management concepts: Healthy Eating, Being Active, Taking Medication, Problem Solving, and Reducing Risks    Based on learning assessment above, most appropriate setting for further diabetes education would be: Individual setting.      PLAN  Sent a telephone encounter to OB provider regarding the fact that NPH insulin is not needed and can be discontinued from med list.  She will not need to test ketones.    Blood sugar monitoring:  Check blood sugars fasting and 2 hours after largest meal of the day  Fasting and before meal targets:  mg/dL  2 hours after start of meal: <180 mg/dL  Keep a blood glucose record for next visit.    Meal Plan Recommendation:   Use portion control and plate planning method.  Carbohydrates to aim for at meals: 30-45 grams and snacks: 0-30 grams.    Follow  Methylphenidate  Oxycodone- acetaminophen    Pharmacy selected "up: December  See Care Plan for co-developed, patient-state behavior change goals.    SUBJECTIVE/OBJECTIVE:  Presents for: Follow-up  Accompanied by: Self  Diabetes education in the past 24mo: No  Focus of Visit: Monitoring, Healthy Eating, Taking Medication  Diabetes type: Type 2  Date of diagnosis: reports diagnosed with Type 2 diabetes this pregnancy  How confident are you filling out medical forms by yourself: Not Assessed  Cultural Influences/Ethnic Background:  Not  or     Diabetes Symptoms & Complications:  Complications assessed today?: No  Patient Problem List and Family Medical History reviewed for relevant medical history, current medical status, and diabetes risk factors.    Vitals:  LMP 09/10/2023 (Approximate)   Estimated body mass index is 31.15 kg/m  as calculated from the following:    Height as of 6/27/23: 1.676 m (5' 6\").    Weight as of 11/24/23: 87.5 kg (193 lb).   Last 3 BP:   BP Readings from Last 3 Encounters:   11/24/23 114/70   10/16/23 118/62   06/27/23 131/66       History   Smoking Status    Never   Smokeless Tobacco    Never       Labs:  Lab Results   Component Value Date    A1C 6.8 11/17/2023     Lab Results   Component Value Date     06/27/2023    GLC 95 12/29/2018     No results found for: \"LDL\"  No results found for: \"HDL\"]  GFR Estimate   Date Value Ref Range Status   06/27/2023 >90 >60 mL/min/1.73m2 Final   12/29/2018 >90 >60 mL/min/[1.73_m2] Final     Comment:     Non  GFR Calc  Starting 12/18/2018, serum creatinine based estimated GFR (eGFR) will be   calculated using the Chronic Kidney Disease Epidemiology Collaboration   (CKD-EPI) equation.       GFR Estimate If Black   Date Value Ref Range Status   12/29/2018 >90 >60 mL/min/[1.73_m2] Final     Comment:      GFR Calc  Starting 12/18/2018, serum creatinine based estimated GFR (eGFR) will be   calculated using the Chronic Kidney Disease Epidemiology Collaboration   (CKD-EPI) " "equation.       Lab Results   Component Value Date    CR 0.64 06/27/2023    CR 0.81 12/29/2018     No results found for: \"MICROALBUMIN\"    Healthy Eating:  Healthy Eating Assessed Today: Yes  Cultural/Episcopal diet restrictions?: No  How many times a week on average do you eat food made away from home (restaurant/take-out)?:  (1-2)  Breakfast: 8-9:30 AM: 2 eggs with injera (1/2), coffee with milk  Lunch: 12-2 PM: injera with beef in sauce OR injera with lentils, ICE sparkling water OR Vitamin Water Zero  Dinner: 7-7:30 PM: last night = rice with vegetables and chicken, ICE sparkling water OR Vitamin Water Zero  Snacks: AM and/or PM: sometimes 1/2 liam, orange, banana, peanuts, OR roast wheat with peanuts; HS: last night = peanuts  Other: up for day at 5:30 AM (on school days); to bed at 11 PM  Has patient met with a dietitian in the past?: Yes    Being Active:  Being Active Assessed Today: Yes  Exercise:: Currently not exercising (ADLs, occasional walk outside)    Monitoring:  Monitoring Assessed Today: Yes  Did patient bring glucose meter to appointment? : Yes  Blood Glucose Meter: Accu-chek  Times checking blood sugar at home (number): 4 (1-2)  Times checking blood sugar at home (per): Day    Date Breakfast  Lunch  Dinner  Bedtime    Before After Before After Before After    11/27  139- 2 hours post        11/26 108 112- 1 hour  130  --    11/25 118 133  157  137      Taking Medications:  Diabetes Medication(s)       Insulin       insulin  UNIT/ML injection    Inject 10 Units Subcutaneous at bedtime          Taking Medication Assessed Today: No  Current Treatments: Diet    Problem Solving:  Problem Solving Assessed Today: Yes  Is the patient at risk for hypoglycemia?: No    Reducing Risks:  Reducing Risks Assessed Today: Yes  Diabetes Risks: History of gestational diabetes, Ethnicity  Additional female risks: Gestational Diabetes (in 3 previous pregnancies)  CAD Risks: Diabetes Mellitus  Has dilated eye " exam at least once a year?: No  Sees dentist every 6 months?: No (last 3-4 years ago)    Healthy Coping:  Healthy Coping Assessed Today: Yes  Emotional response to diabetes: Ready to learn  Informal Support system:: Spouse  Patient Activation Measure Survey Score:       No data to display              Care Plan and Education Provided:  Care Plan: Diabetes   Updates made by Glory Sosa since 11/27/2023 12:00 AM        Problem: Diabetes Self-Management Education Needed to Optimize Self-Care Behaviors         Goal: Healthy Eating - follow a healthy eating pattern for diabetes         Task: Provide education on portion control and consistency in amount, composition and timing of food intake Completed 11/27/2023   Responsible User: Francisca Garner RD        Goal: Monitoring - monitor glucose and ketones as directed    Recent Progress: 30%   Note:    Test glucose fasting and 2 hours after one meal, rotating after meals         Glory Sosa RDN, LD, [facility]    Time Spent: 30 minutes  Encounter Type: Individual    Any diabetes medication dose changes were made via the CDE Protocol per the patient's referring provider. A copy of this encounter was shared with the provider.

## 2023-11-27 NOTE — TELEPHONE ENCOUNTER
Anabell,    I met with Jo Ann today and discussed her Type 2 diabetes diagnosis. Recommend that NPH insulin be discontinued. She has a follow up in 3 weeks and BG will be reviewed at that time to determine if she will need diabetes medication. If you agree, please discontinue NPH insulin.     Thank you,  Glory Sosa RDN, LD, [facility]

## 2023-11-27 NOTE — TELEPHONE ENCOUNTER
Patient call:     Appointment type: return diabetes   Provider: any PA or NP   Return date: every 2 weeks   Speciality phone number: [phone]  Additional appointment(s) needed:   Additional notes: LVM and sent letter x1     Pt saw Dr. Harris on 11/21 next appt should be the week of 12/4-12/8     Then 12/11-12/15   Next 12/25-12/29  Next 1/15-1/19  Next 1/29-2/2  Next 2/12-2/16  Next 2/26-3/1

## 2023-11-28 ENCOUNTER — PATIENT OUTREACH (OUTPATIENT)
Dept: CARE COORDINATION | Facility: CLINIC | Age: 43
End: 2023-11-28

## 2023-11-28 ENCOUNTER — PATIENT OUTREACH (OUTPATIENT)
Dept: NURSING | Facility: CLINIC | Age: 43
End: 2023-11-28

## 2023-11-28 ASSESSMENT — ACTIVITIES OF DAILY LIVING (ADL): DEPENDENT_IADLS:: INDEPENDENT

## 2023-11-28 NOTE — LETTER
[facility]  Patient Centered Plan of Care  About Me:        Patient Name:  Jo Ann Mckeon    YOB: 1980  Age:         43 year old   Senthil MRN:    [id number] Telephone Information:  Home Phone [phone]   Mobile [phone]       Address:  [address]  [facility] [facility] [address] Email address:  No e-mail address on record      Emergency Contact(s)    Name Relationship Lgl Grd Work Phone Home Phone Mobile Phone   1. ALLEN ANTONIO Spouse   [phone]           Primary language:  English     needed? No   [place] [employer]:  [phone] op. 1  Other communication barriers:None  Preferred Method of Communication:  Mail  Current living arrangement: I live in a private home with family  Mobility Status/ Medical Equipment: Independent    Health Maintenance  Health Maintenance Reviewed: Due/Overdue   Health Maintenance Due   Topic Date Due    YEARLY PREVENTIVE VISIT  Never done    LIPID  Never done    MICROALBUMIN  Never done    DIABETIC FOOT EXAM  Never done    ADVANCE CARE PLANNING  Never done    EYE EXAM  Never done    COVID-19 Vaccine (1) Never done    Pneumococcal Vaccine: Pediatrics (0 to 5 Years) and At-Risk Patients (6 to 64 Years) (1 - PCV) Never done    HEPATITIS C SCREENING  Never done    HEPATITIS B IMMUNIZATION (2 of 3 - 19+ 3-dose series) 01/31/2014    HPV TEST  05/06/2021    PAP  05/06/2021    INFLUENZA VACCINE (1) 09/01/2023     My Access Plan  Medical Emergency 911   Primary Clinic Line  - [phone]   24 Hour Appointment Line [phone] or  [address] [place] [address] [phone]) (toll-free)   24 Hour Nurse Line [phone] (toll-free)   Preferred Urgent Care No data recorded   Preferred Hospital [facility]  [phone]     Preferred Pharmacy [id number] - [place], [place] - [address]  [address]     Behavioral Health Crisis Line The National Suicide Prevention Lifeline at 1-936.154.5257 or Text/Call 498     My Care  Team Members  Patient Care Team         Relationship Specialty Notifications Start End    Baltazar Knowles MD PCP - General OB/Gyn  11/17/23     Jeannette Shaver PA-C Physician Assistant Endocrinology, Diabetes, and Metabolism  11/17/23     Phone: [phone] Fax: [phone] [address] [facility] [address]    Francisca Garner RD Diabetes Educator Dietitian  11/20/23      [address] [facility] [address]    Joann Castañeda [facility] Lead Care Coordinator  - Clinical Admissions 11/21/23     Phone: [phone] Fax: [phone]        Glory Sosa Diabetes Educator Diabetes Education  11/27/23               My Care Plans  Self Management and Treatment Plan    Care Plan  Care Plan: Financial Wellbeing       Problem: Patient expresses financial resource strain       Goal: Create an action plan to increase financial stability       Start Date: 11/28/2023 Expected End Date: 2/27/2024    This Visit's Progress: 10%    Priority: High    Note:     Barriers: lack of insurance  Strengths: family support   Patient expressed understanding of goal: yes   Action steps to achieve this goal:  I will continue to lean on my informal supports of my: family  I will continue to work on the application(s) for: Medical Assistance/Insurance  I understand a referral was placed to the Financial Resource Worker, I will receive a call within the next 3 business days.    I will use the clinic as a resource and I understand I can contact my clinic with 24/7 after hours services available.   I will continue to outreach to care coordination as needed for additional resources or supports.                            Advance Care Plans/Directives:   Advanced Care Plan/Directives on file:   No     My Medical and Care Information  Problem List   Patient Active Problem List   Diagnosis    Insulin controlled gestational diabetes mellitus (GDM) in third trimester    GDM, class A2    Type 2 diabetes mellitus without  complication, without long-term current use of insulin (H)    Modified White class B pregestational diabetes mellitus        Current Medications and Allergies:  Current Outpatient Medications   Medication    acetone urine (KETOSTIX) test strip    alcohol swab prep pads    blood glucose (NO BRAND SPECIFIED) test strip    blood glucose monitoring (NO BRAND SPECIFIED) meter device kit    insulin pen needle (ULTICARE SHORT) 31G X 8 MM miscellaneous    misoprostol (CYTOTEC) 200 MCG tablet    Prenatal Vit-Fe Fumarate-FA (PRENATAL MULTIVITAMIN W/IRON) 27-0.8 MG tablet    thin (NO BRAND SPECIFIED) lancets     No current facility-administered medications for this visit.     No Known Allergies    Care Coordination Start Date: 11/20/2023   Frequency of Care Coordination: monthly, more frequently as needed     Form Last Updated: 11/28/2023

## 2023-11-28 NOTE — PROGRESS NOTES
Clinic Care Coordination Contact  Program: Mnsure / Amy Care  County:[facility] Case #:  [facility] Worker:   Johana #:   Subscriber #:   Renewal:  Date Applied:     FRW Outreach:   11/28/23   FRW called patient and left a vm with call back information. FRW will make outreach in one week.  Chasity Naqvi   Financial Resource Worker  [facility]  Clinic Care Coordination  [phone]     Health Insurance:      Referral/Screening:   FRW Screening    Row Name 11/28/23 1002       [facility] Benefits   Is patient requesting help applying for [facility] benefits? No       Insurance:   [place]? No       Is this an insurance renewal? No       Is this a new insurance application request? Yes       Have you recently applied for insurance? No       How many people in your household? 5       Do you file taxes? Yes       How many dependents do you claim? 3       OTHER   Is this a amy care application? Yes       Any other information for the FRW? Not currently working, spouse does work but not sure of his income. Patient will ask spouse prior to FRW outreach.

## 2023-11-28 NOTE — LETTER
[facility] CARE COORDINATION  [address] [place] [place] [address]      November 28, 2023        Jo Ann Mckeon  [address] [facility] [address]      Dear Jo Ann,    I am a clinic care coordinator who works with Baltazar Knowles MD with the [facility]. I wanted to thank you for spending the time to talk with me. Below is a description of clinic care coordination and how I can further assist you shall any additional needs arise.       The clinic care coordination team is made up of a registered nurse, , financial resource worker and community health worker who understand the health care system. The goal of clinic care coordination is to help you manage your health and improve access to the health care system. Our team works alongside your provider to assist you in determining your health and social needs. We can help you obtain health care and community resources, providing you with necessary information and education. We can work with you through any barriers and develop a care plan that helps coordinate and strengthen the communication between you and your care team.  Our services are voluntary and are offered without charge to you personally.    Please feel free to contact me with any questions or concerns regarding care coordination and what we can offer.      We are focused on providing you with the highest-quality healthcare experience possible.    Sincerely,     FLACO Geronimo[facility]  Social Work Care Coordinator  [facility] [place], and [place]  Phone: [phone]    Enclosed: I have enclosed a copy of the Patient Centered Plan of Care. This has helpful information and goals that we have talked about. Please keep this in an easy to access place to use as needed.

## 2023-11-28 NOTE — PROGRESS NOTES
Clinic Care Coordination Contact  Clinic Care Coordination Contact  OUTREACH    Referral Information:  Referral Source: Specialist  Primary Diagnosis: Psychosocial    Chief Complaint   Patient presents with    Clinic Care Coordination - Initial      Universal Utilization: Reviewed 11/28/2023  Clinic Utilization  Difficulty keeping appointments:: No  Compliance Concerns: No  No-Show Concerns: No  No PCP office visit in Past Year: No  Utilization      No Show Count (past year)  0             ED Visits  1             Hospital Admissions  0                    Current as of: 11/27/2023  3:13 PM              Clinical Concerns:  Current Medical Concerns:    Patient Active Problem List   Diagnosis    Insulin controlled gestational diabetes mellitus (GDM) in third trimester    GDM, class A2    Type 2 diabetes mellitus without complication, without long-term current use of insulin (H)    Modified White class B pregestational diabetes mellitus   Current Behavioral Concerns: none noted     Education Provided to patient: SWCC role and reason for call.    Pain  Pain (GOAL):: No  Health Maintenance Reviewed: Due/Overdue   Health Maintenance Due   Topic Date Due    YEARLY PREVENTIVE VISIT  Never done    LIPID  Never done    MICROALBUMIN  Never done    DIABETIC FOOT EXAM  Never done    ADVANCE CARE PLANNING  Never done    EYE EXAM  Never done    COVID-19 Vaccine (1) Never done    Pneumococcal Vaccine: Pediatrics (0 to 5 Years) and At-Risk Patients (6 to 64 Years) (1 - PCV) Never done    HEPATITIS C SCREENING  Never done    HEPATITIS B IMMUNIZATION (2 of 3 - 19+ 3-dose series) 01/31/2014    HPV TEST  05/06/2021    PAP  05/06/2021    INFLUENZA VACCINE (1) 09/01/2023   Clinical Pathway: None    Medication Management:  Medication review status: Medications not reviewed during this encounter due to nature of call.   Current Outpatient Medications   Medication    acetone urine (KETOSTIX) test strip    alcohol swab prep pads    blood  glucose (NO BRAND SPECIFIED) test strip    blood glucose monitoring (NO BRAND SPECIFIED) meter device kit    insulin pen needle (ULTICARE SHORT) 31G X 8 MM miscellaneous    misoprostol (CYTOTEC) 200 MCG tablet    Prenatal Vit-Fe Fumarate-FA (PRENATAL MULTIVITAMIN W/IRON) 27-0.8 MG tablet    thin (NO BRAND SPECIFIED) lancets     No current facility-administered medications for this visit.     Functional Status:  Dependent ADLs:: Independent  Dependent IADLs:: Independent  Bed or wheelchair confined:: No  Mobility Status: Independent  Fallen 2 or more times in the past year?: No  Any fall with injury in the past year?: No    Living Situation:  Current living arrangement:: I live in a private home with family  Type of residence:: Private home - no stairs    Lifestyle & Psychosocial Needs:  Social Determinants of Health     Tobacco Use: Low Risk  (11/24/2023)    Patient History     Smoking Tobacco Use: Never     Smokeless Tobacco Use: Never     Passive Exposure: Not on file   Alcohol Use: Not on file   Financial Resource Strain: Low Risk  (11/28/2023)    Financial Resource Strain     Within the past 12 months, have you or your family members you live with been unable to get utilities (heat, electricity) when it was really needed?: No   Food Insecurity: Low Risk  (11/28/2023)    Food Insecurity     Within the past 12 months, did you worry that your food would run out before you got money to buy more?: No     Within the past 12 months, did the food you bought just not last and you didn t have money to get more?: No   Transportation Needs: Low Risk  (11/28/2023)    Transportation Needs     Within the past 12 months, has lack of transportation kept you from medical appointments, getting your medicines, non-medical meetings or appointments, work, or from getting things that you need?: No   Physical Activity: Not on file   Stress: Not on file   Social Connections: Not on file   Interpersonal Safety: Not on file   Depression:  Not at risk (11/21/2023)    PHQ-2     PHQ-2 Score: 0   Housing Stability: Low Risk  (11/28/2023)    Housing Stability     Do you have housing? : Yes     Are you worried about losing your housing?: No   Postpartum Depression: Not on file     Diet:: Regular  Inadequate nutrition (GOAL):: No  Tube Feeding: No  Inadequate activity/exercise (GOAL):: No  Significant changes in sleep pattern (GOAL): No  Transportation means:: Regular car     Yarsanism or spiritual beliefs that impact treatment:: No  Mental health DX:: No  Mental health management concern (GOAL):: No  Chemical Dependency Status: No Current Concerns  Informal Support system:: Spouse, Family     SWCC spoke with patient to introduce self and offer Care Coordination services. Per report, patients main concern is lack of health insurance for self and family. CC provided education on [place] vs [place]. CC informed patient FRWs can assist with application process pending eligibility. Patient expressed understanding and voiced interested in speaking with FRW regarding potential options. SWCC reviewed patient may also qualify for [employer] Financial Assistance Program and offered to have FRWs review program guidelines with patient. Patient in agreement with plan. FRW referral placed 11/28/2023. Allowed time and space for patient to voice any additional CC needs. Patient declined any concerns related to housing, transportation, food insecurities, and/or further CC needs. Per chart review, patient not established with Primary Care. CC offered to assist with establishing care, however, patients preference is to wait until insurance is obtained. Patient in agreement with care coordination services and monthly outreaches. Created patient centered goals and sent to patient via mail.     Resources and Interventions:  Current Resources:   Community Resources: None  Supplies Currently Used at Home: None  Equipment Currently Used at Home: none  Employment Status:  unemployed  Advance Care Plan/Directive  Advanced Care Plans/Directives on file:: No  Referrals Placed: Financial Services     Care Plan:  Care Plan: Financial Wellbeing       Problem: Patient expresses financial resource strain       Goal: Create an action plan to increase financial stability       Start Date: 11/28/2023 Expected End Date: 2/27/2024    This Visit's Progress: 10%    Priority: High    Note:     Barriers: lack of insurance  Strengths: family support   Patient expressed understanding of goal: yes   Action steps to achieve this goal:  I will continue to lean on my informal supports of my: family  I will continue to work on the application(s) for: Medical Assistance/Insurance  I understand a referral was placed to the Financial Resource Worker, I will receive a call within the next 3 business days.    I will use the clinic as a resource and I understand I can contact my clinic with 24/7 after hours services available.   I will continue to outreach to care coordination as needed for additional resources or supports.                          Patient/Caregiver understanding: Patient reports understanding and denies any additional questions or concerns at this time. CONSUELO CC engaged in AIDET communication during encounter.    Financial Resource Worker Screening  [facility] Benefits  Is patient requesting help applying for [facility] benefits?: No    [place]:  [place] No  Is this an insurance renewal?: No  Is this a new insurance application request?: Yes  Have you recently applied for insurance?: No  How many people in your household? : 5  Do you file taxes?: Yes  How many dependents do you claim?: 3    Any other information for the FRW?: Not currently working, spouse does work but not sure of his income. Patient will ask spouse prior to FRW outreach.    Care Coordination team will tell patient:   Thank you for answering all the questions, based on screening questions, our Financial  Resource Worker will reach out to you with additional questions and next steps.    Outreach Frequency: monthly, more frequently as needed  Future Appointments                [facility] [employer]     In 3 weeks Glory Sosa RD [place], [place]          Plan: Patient will review introduction to CC letter and Complex Care Plan sent via mail. SW CC will remain available for CC needs and will schedule a follow-up outreach in one month.     FL[facility]O [facility]  Social Work Care Coordinator  [facility] [place], [place], and [place]  Phone: [phone]

## 2023-12-04 ENCOUNTER — TELEPHONE (OUTPATIENT)
Dept: OBGYN | Facility: CLINIC | Age: 43
End: 2023-12-04

## 2023-12-04 ENCOUNTER — ANCILLARY PROCEDURE (OUTPATIENT)
Dept: ULTRASOUND IMAGING | Facility: CLINIC | Age: 43
End: 2023-12-04
Attending: OBSTETRICS & GYNECOLOGY

## 2023-12-04 DIAGNOSIS — O02.1 MISSED ABORTION: ICD-10-CM

## 2023-12-04 DIAGNOSIS — O03.4 INCOMPLETE ABORTION: Primary | ICD-10-CM

## 2023-12-04 PROBLEM — O24.414 INSULIN CONTROLLED GESTATIONAL DIABETES MELLITUS (GDM) IN THIRD TRIMESTER: Status: RESOLVED | Noted: 2017-10-23 | Resolved: 2023-12-04

## 2023-12-04 PROBLEM — O24.419 GDM, CLASS A2: Status: RESOLVED | Noted: 2017-10-24 | Resolved: 2023-12-04

## 2023-12-04 PROCEDURE — 76801 OB US < 14 WKS SINGLE FETUS: CPT | Performed by: FAMILY MEDICINE

## 2023-12-04 RX ORDER — MISOPROSTOL 200 UG/1
TABLET ORAL
Qty: 4 TABLET | Refills: 0 | Status: SHIPPED | OUTPATIENT
Start: 2023-12-04 | End: 2024-02-23

## 2023-12-04 NOTE — TELEPHONE ENCOUNTER
I spoke w/Pt:  her U/S showed possible retained POC, but she has minimal VB now and no pelvic pain, nor F/C.  Will Rx Cytotec 800 mcg vaginall tonight.  Will repeat U/S Thu or Fri this week to check for retained POC.

## 2023-12-05 NOTE — TELEPHONE ENCOUNTER
Spoke with the pt.     US scheduled at  on 12/7 at 1040am.    Pt has only had light bleeding since taking the cytotec.    Ary KANG RN

## 2023-12-05 NOTE — TELEPHONE ENCOUNTER
Baltazar Knowles MD  [facility] [id number] hours ago (5:05 PM)     LC  See Notes.  She needs follow-up U/S this week (Thu or Fri) to recheck for retained POC after repeat Cytotec course.

## 2023-12-07 ENCOUNTER — PATIENT OUTREACH (OUTPATIENT)
Dept: CARE COORDINATION | Facility: CLINIC | Age: 43
End: 2023-12-07

## 2023-12-07 ENCOUNTER — ANCILLARY PROCEDURE (OUTPATIENT)
Dept: ULTRASOUND IMAGING | Facility: CLINIC | Age: 43
End: 2023-12-07
Attending: OBSTETRICS & GYNECOLOGY

## 2023-12-07 DIAGNOSIS — O03.4 INCOMPLETE ABORTION: ICD-10-CM

## 2023-12-07 PROCEDURE — 76801 OB US < 14 WKS SINGLE FETUS: CPT | Performed by: OBSTETRICS & GYNECOLOGY

## 2023-12-07 PROCEDURE — 76817 TRANSVAGINAL US OBSTETRIC: CPT | Performed by: OBSTETRICS & GYNECOLOGY

## 2023-12-07 NOTE — PROGRESS NOTES
Clinic Care Coordination Contact  Program: [facility]  County:[facility] Case #:  [facility] Worker:   Johana #:   Subscriber #:   Renewal:  Date Applied:     FRW Outreach:   12/7/23 FRW called patient and left a final vm with call back information as attempt x2 with no answer or return phone calls. FRW closed the FRW program and remove patient from panel. Patient can be referred back to FRW if needed.      Chasity Naqvi   Financial Resource Worker  [facility] Care Coordination  [phone]   11/28/23   FRW called patient and left a vm with call back information. FRW will make outreach in one week.  Chasity Naqvi   Financial Resource Worker  [facility] Care Coordination  [phone]     Health Insurance:      Referral/Screening:   FRW Screening    Row Name 11/28/23 1002       [facility] Benefits   Is patient requesting help applying for [facility] benefits? No       [place]:   [place]? No       Is this an insurance renewal? No       Is this a new insurance application request? Yes       Have you recently applied for insurance? No       How many people in your household? 5       Do you file taxes? Yes       How many dependents do you claim? 3       OTHER   Is this a [facility] application? Yes       Any other information for the FRW? Not currently working, spouse does work but not sure of his income. Patient will ask spouse prior to FRW outreach.

## 2023-12-19 ENCOUNTER — VIRTUAL VISIT (OUTPATIENT)
Dept: EDUCATION SERVICES | Facility: CLINIC | Age: 43
End: 2023-12-19

## 2023-12-19 DIAGNOSIS — E11.9 TYPE 2 DIABETES MELLITUS WITHOUT COMPLICATION, WITHOUT LONG-TERM CURRENT USE OF INSULIN (H): Primary | ICD-10-CM

## 2023-12-19 PROCEDURE — G0108 DIAB MANAGE TRN  PER INDIV: HCPCS | Mod: 95

## 2023-12-19 NOTE — LETTER
"    12/19/2023         RE: Jo Ann Mckeon  [address] [facility] [address]        Dear Colleague,    Thank you for referring your patient, Jo Ann Mckeon, to the [facility]. Please see a copy of my visit note below.    Diabetes Self-Management Education & Support  Presents for: Follow-up    Type of Service: Telephone Visit    Originating Location (Patient Location): Home  Distant Location (Provider Location): [facility]  Mode of Communication:  Telephone    Telephone Visit Start Time:  115  Telephone Visit End Time (telephone visit stop time): 145    How would patient like to obtain AVS? Mail a copy    ASSESSMENT:  Jo Ann states she is \"waiting on an insurance card from [facility] to see if strips are covered.\" Reports the insurance was approved. Discussed Relion meter and strips and supplies. May be cost effective for her to buy OTC at [facility].  Most all BG in target, re- assess at February visit.     Discussed the importance of having a primary provider. She plans to look into this before next diabetes follow up.    She did receive the plate planner in the mail and has a good understanding of healthy meal plan for diabetes.     Patient's most recent   Lab Results   Component Value Date    A1C 6.8 11/17/2023     is meeting goal of <7.0    Diabetes knowledge and skills assessment:   Patient is knowledgeable in diabetes management concepts related to: Healthy Eating and Monitoring    Continue education with the following diabetes management concepts: Being Active, Problem Solving, and Reducing Risks    Based on learning assessment above, most appropriate setting for further diabetes education would be: Individual setting.      PLAN  A1c ordered after 2/17/23.  Blood sugar monitoring:  Check blood sugars fasting and 2 hours after largest meal of the day  Fasting and before meal targets:  mg/dL  2 hours after start of meal: <180 mg/dL  Keep a blood glucose record " "for next visit.    Meal Plan Recommendation:   Use portion control and plate planning method.  Carbohydrates to aim for at meals: 30-45 grams and snacks: 0-30 grams.  Use [employer] for recipe and meal planning ideas  Use [employer] samuel for carbohydrate reference.    Exercise / activity plan:   Recommend starting slow and increasing as tolerated to goal of 150 minutes per week.     Follow-up: February 2024    See Care Plan for co-developed, patient-state behavior change goals.    SUBJECTIVE/OBJECTIVE:  Presents for: Follow-up  Accompanied by: Self  Diabetes education in the past 24mo: No  Focus of Visit: Monitoring, Healthy Eating, Taking Medication  Diabetes type: Type 2  Date of diagnosis: reports diagnosed with Type 2 diabetes this pregnancy  Disease course: Stable  How confident are you filling out medical forms by yourself:: Not Assessed  Transportation concerns: No  Difficulty affording diabetes testing supplies?: Yes (the test strips were expensive)  Other concerns:: None  Cultural Influences/Ethnic Background:  Not  or     Diabetes Symptoms & Complications:  Symptom course: Stable  Weight trend: Stable  Complications assessed today?: No    Patient Problem List and Family Medical History reviewed for relevant medical history, current medical status, and diabetes risk factors.    Vitals:  LMP 09/10/2023 (Approximate)   Estimated body mass index is 31.15 kg/m  as calculated from the following:    Height as of 6/27/23: 1.676 m (5' 6\").    Weight as of 11/24/23: 87.5 kg (193 lb).   Last 3 BP:   BP Readings from Last 3 Encounters:   11/24/23 114/70   10/16/23 118/62   06/27/23 131/66       History   Smoking Status    Never   Smokeless Tobacco    Never       Labs:  Lab Results   Component Value Date    A1C 6.8 11/17/2023     Lab Results   Component Value Date     06/27/2023    GLC 95 12/29/2018     No results found for: \"LDL\"  No results found for: \"HDL\"]  GFR Estimate   Date Value " "Ref Range Status   06/27/2023 >90 >60 mL/min/1.73m2 Final   12/29/2018 >90 >60 mL/min/[1.73_m2] Final     Comment:     Non  GFR Calc  Starting 12/18/2018, serum creatinine based estimated GFR (eGFR) will be   calculated using the Chronic Kidney Disease Epidemiology Collaboration   (CKD-EPI) equation.       GFR Estimate If Black   Date Value Ref Range Status   12/29/2018 >90 >60 mL/min/[1.73_m2] Final     Comment:      GFR Calc  Starting 12/18/2018, serum creatinine based estimated GFR (eGFR) will be   calculated using the Chronic Kidney Disease Epidemiology Collaboration   (CKD-EPI) equation.       Lab Results   Component Value Date    CR 0.64 06/27/2023    CR 0.81 12/29/2018     No results found for: \"MICROALBUMIN\"    Healthy Eating:  Healthy Eating Assessed Today: Yes  Cultural/Anabaptist diet restrictions?: No  Meal planning/habits: Low carb, Plate planning method  How many times a week on average do you eat food made away from home (restaurant/take-out)?:  (1-2)  Meals include: Breakfast, Lunch, Dinner, Morning Snack, Afternoon Snack  Breakfast: 8-9:30 AM: 2 eggs with injera (1/2), coffee with milk  Lunch: 12-2 PM: injera with beef in sauce OR injera with lentils, ICE sparkling water OR Vitamin Water Zero  Dinner: 7-7:30 PM: last night = rice with vegetables and chicken, ICE sparkling water OR Vitamin Water Zero  Snacks: AM and/or PM: sometimes 1/2 liam, orange, banana, peanuts, OR roast wheat with peanuts; HS: last night = peanuts  Other: up for day at 5:30 AM (on school days); to bed at 11 PM  Beverages: Water, Coffee, Milk  Has patient met with a dietitian in the past?: Yes    Being Active:  Being Active Assessed Today: Yes  Exercise:: Currently not exercising (ADLs, occasional walk outside)  Barrier to exercise: None    Monitoring:  Monitoring Assessed Today: Yes  Did patient bring glucose meter to appointment? : Yes  Blood Glucose Meter: Accu-chek  Times checking blood sugar " at home (number): 4 (1-2)  Times checking blood sugar at home (per): Day  Blood glucose trend: No change    Date Breakfast  Lunch  Dinner  Bedtime    Before After Before After Before After    12/19 112         12/18 134   157      12/17 125         12/16 132     143    12/15 125         12/14 140   162      12/13 12/12 12/11 127  137  127       148      Taking Medications:    Taking Medication Assessed Today: No  Current Treatments: Diet    Problem Solving:  Problem Solving Assessed Today: Yes  Is the patient at risk for hypoglycemia?: No    Reducing Risks:  Reducing Risks Assessed Today: Yes  Diabetes Risks: History of gestational diabetes, Ethnicity  Additional female risks: Gestational Diabetes (in 3 previous pregnancies)  CAD Risks: Diabetes Mellitus  Has dilated eye exam at least once a year?: No  Sees dentist every 6 months?: No (last 3-4 years ago)    Healthy Coping:  Healthy Coping Assessed Today: Yes  Emotional response to diabetes: Ready to learn  Informal Support system:: Spouse  Stage of change: ACTION (Actively working towards change)  Patient Activation Measure Survey Score:       No data to display              Care Plan and Education Provided:  Care Plan: Diabetes   Updates made by Glory Sosa since 12/19/2023 12:00 AM        Problem: Diabetes Self-Management Education Needed to Optimize Self-Care Behaviors         Goal: Healthy Eating - follow a healthy eating pattern for diabetes         Task: Provide education on weight management Completed 12/19/2023   Responsible User: Francisca Garner RD        Task: Develop individualized healthy eating plan with patient Completed 12/19/2023   Responsible User: Francisca Garner RD        Goal: Monitoring - monitor glucose and ketones as directed    This Visit's Progress: 80%   Recent Progress: 30%   Note:    Test glucose fasting and 2 hours after one meal, rotating after meals       Goal: Taking Medication - patient is consistently taking  medications as directed         Task: Provide education on frequency and refill details of medications Completed 12/19/2023   Responsible User: Francisca Garner RD        Goal: Problem Solving - know how to prevent and manage short-term diabetes complications         Task: Provide education on high blood glucose - causes, signs/symptoms, prevention and treatment Completed 12/19/2023   Responsible User: Francisca Garner RD        Task: Provide education on how to care for diabetes on sick days Completed 12/19/2023   Responsible User: Francisca Garner RD        Task: Provide education on when to call a health care provider Completed 12/19/2023   Responsible User: Francisca Garner RD        Goal: Reducing Risks - know how to prevent and treat long-term diabetes complications         Task: Provide education on Hemoglobin A1c - goals and relationship to blood glucose levels Completed 12/19/2023   Responsible User: Francisca Garner RD        Goal: Healthy Coping - use available resources to cope with the challenges of managing diabetes         Task: Provide education on the benefits of making appropriate lifestyle changes Completed 12/19/2023   Responsible User: Francisca Garner RD Melissa Klohn RDN, LD, [facility]    Time Spent: 30 minutes  Encounter Type: Individual    Any diabetes medication dose changes were made via the CDE Protocol per the patient's referring provider. A copy of this encounter was shared with the provider.

## 2023-12-19 NOTE — PROGRESS NOTES
"Diabetes Self-Management Education & Support  Presents for: Follow-up    Type of Service: Telephone Visit    Originating Location (Patient Location): Home  Distant Location (Provider Location): [facility]  Mode of Communication:  Telephone    Telephone Visit Start Time:  115  Telephone Visit End Time (telephone visit stop time): 145    How would patient like to obtain AVS? Mail a copy    ASSESSMENT:  Jo Ann states she is \"waiting on an insurance card from [facility] to see if strips are covered.\" Reports the insurance was approved. Discussed Relion meter and strips and supplies. May be cost effective for her to buy OTC at [facility].  Most all BG in target, re- assess at February visit.     Discussed the importance of having a primary provider. She plans to look into this before next diabetes follow up.    She did receive the plate planner in the mail and has a good understanding of healthy meal plan for diabetes.     Patient's most recent   Lab Results   Component Value Date    A1C 6.8 11/17/2023     is meeting goal of <7.0    Diabetes knowledge and skills assessment:   Patient is knowledgeable in diabetes management concepts related to: Healthy Eating and Monitoring    Continue education with the following diabetes management concepts: Being Active, Problem Solving, and Reducing Risks    Based on learning assessment above, most appropriate setting for further diabetes education would be: Individual setting.      PLAN  A1c ordered after 2/17/23.  Blood sugar monitoring:  Check blood sugars fasting and 2 hours after largest meal of the day  Fasting and before meal targets:  mg/dL  2 hours after start of meal: <180 mg/dL  Keep a blood glucose record for next visit.    Meal Plan Recommendation:   Use portion control and plate planning method.  Carbohydrates to aim for at meals: 30-45 grams and snacks: 0-30 grams.  Use diabetesfoodhub.org for recipe and meal planning ideas  Use Max-Viz samuel for " "carbohydrate reference.    Exercise / activity plan:   Recommend starting slow and increasing as tolerated to goal of 150 minutes per week.     Follow-up: February 2024    See Care Plan for co-developed, patient-state behavior change goals.    SUBJECTIVE/OBJECTIVE:  Presents for: Follow-up  Accompanied by: Self  Diabetes education in the past 24mo: No  Focus of Visit: Monitoring, Healthy Eating, Taking Medication  Diabetes type: Type 2  Date of diagnosis: reports diagnosed with Type 2 diabetes this pregnancy  Disease course: Stable  How confident are you filling out medical forms by yourself:: Not Assessed  Transportation concerns: No  Difficulty affording diabetes testing supplies?: Yes (the test strips were expensive)  Other concerns:: None  Cultural Influences/Ethnic Background:  Not  or     Diabetes Symptoms & Complications:  Symptom course: Stable  Weight trend: Stable  Complications assessed today?: No    Patient Problem List and Family Medical History reviewed for relevant medical history, current medical status, and diabetes risk factors.    Vitals:  LMP 09/10/2023 (Approximate)   Estimated body mass index is 31.15 kg/m  as calculated from the following:    Height as of 6/27/23: 1.676 m (5' 6\").    Weight as of 11/24/23: 87.5 kg (193 lb).   Last 3 BP:   BP Readings from Last 3 Encounters:   11/24/23 114/70   10/16/23 118/62   06/27/23 131/66       History   Smoking Status    Never   Smokeless Tobacco    Never       Labs:  Lab Results   Component Value Date    A1C 6.8 11/17/2023     Lab Results   Component Value Date     06/27/2023    GLC 95 12/29/2018     No results found for: \"LDL\"  No results found for: \"HDL\"]  GFR Estimate   Date Value Ref Range Status   06/27/2023 >90 >60 mL/min/1.73m2 Final   12/29/2018 >90 >60 mL/min/[1.73_m2] Final     Comment:     Non  GFR Calc  Starting 12/18/2018, serum creatinine based estimated GFR (eGFR) will be   calculated using the Chronic " "Kidney Disease Epidemiology Collaboration   (CKD-EPI) equation.       GFR Estimate If Black   Date Value Ref Range Status   12/29/2018 >90 >60 mL/min/[1.73_m2] Final     Comment:      GFR Calc  Starting 12/18/2018, serum creatinine based estimated GFR (eGFR) will be   calculated using the Chronic Kidney Disease Epidemiology Collaboration   (CKD-EPI) equation.       Lab Results   Component Value Date    CR 0.64 06/27/2023    CR 0.81 12/29/2018     No results found for: \"MICROALBUMIN\"    Healthy Eating:  Healthy Eating Assessed Today: Yes  Cultural/Rastafarian diet restrictions?: No  Meal planning/habits: Low carb, Plate planning method  How many times a week on average do you eat food made away from home (restaurant/take-out)?:  (1-2)  Meals include: Breakfast, Lunch, Dinner, Morning Snack, Afternoon Snack  Breakfast: 8-9:30 AM: 2 eggs with injera (1/2), coffee with milk  Lunch: 12-2 PM: injera with beef in sauce OR injera with lentils, ICE sparkling water OR Vitamin Water Zero  Dinner: 7-7:30 PM: last night = rice with vegetables and chicken, ICE sparkling water OR Vitamin Water Zero  Snacks: AM and/or PM: sometimes 1/2 liam, orange, banana, peanuts, OR roast wheat with peanuts; HS: last night = peanuts  Other: up for day at 5:30 AM (on school days); to bed at 11 PM  Beverages: Water, Coffee, Milk  Has patient met with a dietitian in the past?: Yes    Being Active:  Being Active Assessed Today: Yes  Exercise:: Currently not exercising (ADLs, occasional walk outside)  Barrier to exercise: None    Monitoring:  Monitoring Assessed Today: Yes  Did patient bring glucose meter to appointment? : Yes  Blood Glucose Meter: Accu-chek  Times checking blood sugar at home (number): 4 (1-2)  Times checking blood sugar at home (per): Day  Blood glucose trend: No change    Date Breakfast  Lunch  Dinner  Bedtime    Before After Before After Before After    12/19 112         12/18 134   157      12/17 125         12/16 " 132     143    12/15 125         12/14 140   162      12/13 12/12 12/11 127  137  127       148      Taking Medications:    Taking Medication Assessed Today: No  Current Treatments: Diet    Problem Solving:  Problem Solving Assessed Today: Yes  Is the patient at risk for hypoglycemia?: No    Reducing Risks:  Reducing Risks Assessed Today: Yes  Diabetes Risks: History of gestational diabetes, Ethnicity  Additional female risks: Gestational Diabetes (in 3 previous pregnancies)  CAD Risks: Diabetes Mellitus  Has dilated eye exam at least once a year?: No  Sees dentist every 6 months?: No (last 3-4 years ago)    Healthy Coping:  Healthy Coping Assessed Today: Yes  Emotional response to diabetes: Ready to learn  Informal Support system:: Spouse  Stage of change: ACTION (Actively working towards change)  Patient Activation Measure Survey Score:       No data to display              Care Plan and Education Provided:  Care Plan: Diabetes   Updates made by Glory Sosa since 12/19/2023 12:00 AM        Problem: Diabetes Self-Management Education Needed to Optimize Self-Care Behaviors         Goal: Healthy Eating - follow a healthy eating pattern for diabetes         Task: Provide education on weight management Completed 12/19/2023   Responsible User: Francisca Garner RD        Task: Develop individualized healthy eating plan with patient Completed 12/19/2023   Responsible User: Francisca Garner RD        Goal: Monitoring - monitor glucose and ketones as directed    This Visit's Progress: 80%   Recent Progress: 30%   Note:    Test glucose fasting and 2 hours after one meal, rotating after meals       Goal: Taking Medication - patient is consistently taking medications as directed         Task: Provide education on frequency and refill details of medications Completed 12/19/2023   Responsible User: Francisca Garner RD        Goal: Problem Solving - know how to prevent and manage short-term diabetes  complications         Task: Provide education on high blood glucose - causes, signs/symptoms, prevention and treatment Completed 12/19/2023   Responsible User: Francisca Garner RD        Task: Provide education on how to care for diabetes on sick days Completed 12/19/2023   Responsible User: Francisca Garner RD        Task: Provide education on when to call a health care provider Completed 12/19/2023   Responsible User: Francisca Garner RD        Goal: Reducing Risks - know how to prevent and treat long-term diabetes complications         Task: Provide education on Hemoglobin A1c - goals and relationship to blood glucose levels Completed 12/19/2023   Responsible User: Francisca Garner RD        Goal: Healthy Coping - use available resources to cope with the challenges of managing diabetes         Task: Provide education on the benefits of making appropriate lifestyle changes Completed 12/19/2023   Responsible User: Francisca Garner RD Melissa Klohn RDN, LD, [facility]    Time Spent: 30 minutes  Encounter Type: Individual    Any diabetes medication dose changes were made via the CDE Protocol per the patient's referring provider. A copy of this encounter was shared with the provider.

## 2023-12-20 ENCOUNTER — PATIENT OUTREACH (OUTPATIENT)
Dept: CARE COORDINATION | Facility: CLINIC | Age: 43
End: 2023-12-20

## 2023-12-20 NOTE — PROGRESS NOTES
Clinic Care Coordination Contact  [facility]/Voicemail    Clinical Data: Care Coordinator Outreach  Outreach Documentation Number of Outreach Attempt   12/20/2023  11:40 AM 1     Left message on patient's voicemail with call back information and requested return call.    Plan: Care Coordinator will try to reach patient again in 1-2 business days.    FLACO Geronimo/[facility]  Social Work Care Coordinator  [facility] - [place], [place], and [place]  Phone: [phone]

## 2024-01-04 ENCOUNTER — PATIENT OUTREACH (OUTPATIENT)
Dept: CARE COORDINATION | Facility: CLINIC | Age: 44
End: 2024-01-04
Payer: COMMERCIAL

## 2024-01-04 NOTE — PROGRESS NOTES
Clinic Care Coordination Contact  [facility]/Voicemail    Clinical Data: Care Coordinator Outreach  Outreach Documentation Number of Outreach Attempt   12/20/2023  11:46 AM 1   1/4/2024   1:48 PM 2     Left message on patient's voicemail with call back information and requested return call.    Plan: Care Coordinator will send disenrollment letter with care coordinator contact information via mail. Care Coordinator will do no further outreaches at this time.    FLACO Geronimo/[facility]  Social Work Care Coordinator  [facility], and [place]  Phone: [phone]

## 2024-01-04 NOTE — LETTER
[facility]  [address] [place] [address]    January 4, 2024      Jo Ann Mckeon  [address] [address], located within [facility] [address]          Dear Jo Ann,    I have been unsuccessful in reaching you since our last contact. At this time the Care Coordination team will make no further attempts to reach you, however this does not change your ability to continue receiving care from your providers at your primary care clinic. If you need additional support from a care coordinator in the future please contact me.    All of us at [facility] are invested in your health and are here to assist you in meeting your goals.     Sincerely,    FLACO Geronimo/[facility]  Social Work Care Coordinator  [facility] [place], and [place]  Phone: [phone]

## 2024-02-23 ENCOUNTER — OFFICE VISIT (OUTPATIENT)
Dept: FAMILY MEDICINE | Facility: CLINIC | Age: 44
End: 2024-02-23
Payer: COMMERCIAL

## 2024-02-23 VITALS
TEMPERATURE: 97.9 F | RESPIRATION RATE: 16 BRPM | BODY MASS INDEX: 31.49 KG/M2 | HEART RATE: 63 BPM | OXYGEN SATURATION: 99 % | DIASTOLIC BLOOD PRESSURE: 84 MMHG | HEIGHT: 65 IN | WEIGHT: 189 LBS | SYSTOLIC BLOOD PRESSURE: 127 MMHG

## 2024-02-23 DIAGNOSIS — H10.31 ACUTE BACTERIAL CONJUNCTIVITIS OF RIGHT EYE: ICD-10-CM

## 2024-02-23 DIAGNOSIS — Z12.4 CERVICAL CANCER SCREENING: ICD-10-CM

## 2024-02-23 DIAGNOSIS — E11.9 TYPE 2 DIABETES MELLITUS WITHOUT COMPLICATION, WITHOUT LONG-TERM CURRENT USE OF INSULIN (H): ICD-10-CM

## 2024-02-23 DIAGNOSIS — Z11.59 ENCOUNTER FOR HEPATITIS C SCREENING TEST FOR LOW RISK PATIENT: ICD-10-CM

## 2024-02-23 DIAGNOSIS — Z12.31 VISIT FOR SCREENING MAMMOGRAM: ICD-10-CM

## 2024-02-23 LAB
ANION GAP SERPL CALCULATED.3IONS-SCNC: 7 MMOL/L (ref 7–15)
BUN SERPL-MCNC: 12.7 MG/DL (ref 6–20)
CALCIUM SERPL-MCNC: 9.2 MG/DL (ref 8.6–10)
CHLORIDE SERPL-SCNC: 104 MMOL/L (ref 98–107)
CHOLEST SERPL-MCNC: 147 MG/DL
CREAT SERPL-MCNC: 0.58 MG/DL (ref 0.51–0.95)
CREAT UR-MCNC: 70.9 MG/DL
DEPRECATED HCO3 PLAS-SCNC: 26 MMOL/L (ref 22–29)
EGFRCR SERPLBLD CKD-EPI 2021: >90 ML/MIN/1.73M2
GLUCOSE SERPL-MCNC: 145 MG/DL (ref 70–99)
HBA1C MFR BLD: 7.3 % (ref 0–5.6)
HCV AB SERPL QL IA: NONREACTIVE
HDLC SERPL-MCNC: 40 MG/DL
HOLD SPECIMEN: NORMAL
HOLD SPECIMEN: NORMAL
LDLC SERPL CALC-MCNC: 97 MG/DL
MICROALBUMIN UR-MCNC: <12 MG/L
MICROALBUMIN/CREAT UR: NORMAL MG/G{CREAT}
NONHDLC SERPL-MCNC: 107 MG/DL
POTASSIUM SERPL-SCNC: 4.2 MMOL/L (ref 3.4–5.3)
SODIUM SERPL-SCNC: 137 MMOL/L (ref 135–145)
TRIGL SERPL-MCNC: 49 MG/DL

## 2024-02-23 PROCEDURE — 82570 ASSAY OF URINE CREATININE: CPT | Performed by: FAMILY MEDICINE

## 2024-02-23 PROCEDURE — G0145 SCR C/V CYTO,THINLAYER,RESCR: HCPCS | Performed by: FAMILY MEDICINE

## 2024-02-23 PROCEDURE — 87624 HPV HI-RISK TYP POOLED RSLT: CPT | Performed by: FAMILY MEDICINE

## 2024-02-23 PROCEDURE — 99207 PR FOOT EXAM NO CHARGE: CPT | Performed by: FAMILY MEDICINE

## 2024-02-23 PROCEDURE — 80048 BASIC METABOLIC PNL TOTAL CA: CPT | Performed by: FAMILY MEDICINE

## 2024-02-23 PROCEDURE — 80061 LIPID PANEL: CPT | Performed by: FAMILY MEDICINE

## 2024-02-23 PROCEDURE — 83036 HEMOGLOBIN GLYCOSYLATED A1C: CPT | Performed by: FAMILY MEDICINE

## 2024-02-23 PROCEDURE — 82043 UR ALBUMIN QUANTITATIVE: CPT | Performed by: FAMILY MEDICINE

## 2024-02-23 PROCEDURE — 99214 OFFICE O/P EST MOD 30 MIN: CPT | Performed by: FAMILY MEDICINE

## 2024-02-23 PROCEDURE — 36415 COLL VENOUS BLD VENIPUNCTURE: CPT | Performed by: FAMILY MEDICINE

## 2024-02-23 PROCEDURE — 86803 HEPATITIS C AB TEST: CPT | Performed by: FAMILY MEDICINE

## 2024-02-23 RX ORDER — METFORMIN HCL 500 MG
500 TABLET, EXTENDED RELEASE 24 HR ORAL 2 TIMES DAILY WITH MEALS
Qty: 180 TABLET | Refills: 1 | Status: SHIPPED | OUTPATIENT
Start: 2024-02-23 | End: 2024-07-29

## 2024-02-23 RX ORDER — CIPROFLOXACIN HYDROCHLORIDE 3.5 MG/ML
1 SOLUTION/ DROPS TOPICAL EVERY 4 HOURS
Qty: 10 ML | Refills: 0 | Status: SHIPPED | OUTPATIENT
Start: 2024-02-23 | End: 2024-07-29

## 2024-02-23 SDOH — HEALTH STABILITY: PHYSICAL HEALTH: ON AVERAGE, HOW MANY DAYS PER WEEK DO YOU ENGAGE IN MODERATE TO STRENUOUS EXERCISE (LIKE A BRISK WALK)?: 1 DAY

## 2024-02-23 SDOH — HEALTH STABILITY: PHYSICAL HEALTH: ON AVERAGE, HOW MANY MINUTES DO YOU ENGAGE IN EXERCISE AT THIS LEVEL?: 20 MIN

## 2024-02-23 ASSESSMENT — SOCIAL DETERMINANTS OF HEALTH (SDOH)
IN A TYPICAL WEEK, HOW MANY TIMES DO YOU TALK ON THE PHONE WITH FAMILY, FRIENDS, OR NEIGHBORS?: MORE THAN THREE TIMES A WEEK
DO YOU BELONG TO ANY CLUBS OR ORGANIZATIONS SUCH AS CHURCH GROUPS UNIONS, FRATERNAL OR ATHLETIC GROUPS, OR SCHOOL GROUPS?: PATIENT DECLINED
HOW OFTEN DO YOU ATTENT MEETINGS OF THE CLUB OR ORGANIZATION YOU BELONG TO?: PATIENT DECLINED
HOW OFTEN DO YOU GET TOGETHER WITH FRIENDS OR RELATIVES?: ONCE A WEEK

## 2024-02-23 ASSESSMENT — LIFESTYLE VARIABLES: HOW OFTEN DO YOU HAVE A DRINK CONTAINING ALCOHOL: NEVER

## 2024-02-23 NOTE — PROGRESS NOTES
"Preventive Care Visit  [facility]  Adriane Smyth MD, Family Medicine  Feb 23, 2024    Assessment & Plan     (E11.9) Type 2 diabetes mellitus without complication, without long-term current use of insulin (H)  Comment:   Plan: Lipid panel reflex to direct LDL Fasting,         Albumin Random Urine Quantitative with Creat         Ratio, Adult Eye  Referral, Hemoglobin        A1c, FOOT EXAM, Basic metabolic panel  (Ca, Cl,        CO2, Creat, Gluc, K, Na, BUN), metFORMIN         (GLUCOPHAGE XR) 500 MG 24 hr tablet        Discussed metformin   Discussed medication side effects .    (Z12.31) Visit for screening mammogram  Comment:   Plan: MA SCREENING DIGITAL BILAT - Future  (s+30)            (Z12.4) Cervical cancer screening  Comment:   Plan: Pap screen with HPV - recommended age 30 - 65         years            (Z11.59) Encounter for hepatitis C screening test for low risk patient  Comment:   Plan: Hepatitis C Screen Reflex to HCV RNA Quant and         Genotype            (H10.31) Acute bacterial conjunctivitis of right eye  Comment:   Plan: ciprofloxacin (CILOXAN) 0.3 % ophthalmic         solution              BMI  Estimated body mass index is 31.45 kg/m  as calculated from the following:    Height as of this encounter: 1.651 m (5' 5\").    Weight as of this encounter: 85.7 kg (189 lb).   Weight management plan: Discussed healthy diet and exercise guidelines    Counseling  Appropriate preventive services were discussed with this patient, including applicable screening as appropriate for fall prevention, nutrition, physical activity, Tobacco-use cessation, weight loss and cognition.  Checklist reviewing preventive services available has been given to the patient.  Reviewed patient's diet, addressing concerns and/or questions.   She is at risk for lack of exercise and has been provided with information to increase physical activity for the benefit of her well-being.   The patient was " instructed to see the dentist every 6 months.   She is at risk for psychosocial distress and has been provided with information to reduce risk.       Lalo Cherry is a 43 year old, presenting for the following:  Physical (PE/PAP) and Diabetes (Follow-up diabetes)        2/23/2024    10:12 AM   Additional Questions   Roomed by Consuelo Talamantes        [facility] Directive  Patient does not have a Health Care Directive or Living Will:     HPI        2/23/2024   General Health   How would you rate your overall physical health? Good   Feel stress (tense, anxious, or unable to sleep) Only a little    Only a little   (!) STRESS CONCERN      2/23/2024   Nutrition   Three or more servings of calcium each day? Yes   Diet: Carbohydrate counting   How many servings of fruit and vegetables per day? (!) 0-1   How many sweetened beverages each day? 0-1         2/23/2024   Exercise   Days per week of moderate/strenous exercise 1 day    1 day   Average minutes spent exercising at this level 20 min   (!) EXERCISE CONCERN      2/23/2024   Social Factors   Frequency of gathering with friends or relatives Once a week   Worry food won't last until get money to buy more No    No   Food not last or not have enough money for food? No    No   Do you have housing?  Yes    Yes   Are you worried about losing your housing? No    No   Lack of transportation? No    No   Unable to get utilities (heat,electricity)? No    No         2/23/2024   Dental   Dentist two times every year? (!) NO         2/23/2024   TB Screening   Were you born outside of [place]?  (!) YES           Today's PHQ-2 Score:       2/23/2024    10:04 AM   PHQ-2 ( 1999 [employer])   Q1: Little interest or pleasure in doing things 0   Q2: Feeling down, depressed or hopeless 0   PHQ-2 Score 0   Q1: Little interest or pleasure in doing things Not at all   Q2: Feeling down, depressed or hopeless Not at all   PHQ-2 Score 0           2/23/2024   Substance Use   Alcohol more than 3/day or more  "than 7/wk No   Do you use any other substances recreationally? No     Social History     Tobacco Use    Smoking status: Never     Passive exposure: Never    Smokeless tobacco: Never   Vaping Use    Vaping Use: Never used   Substance Use Topics    Alcohol use: Not Currently    Drug use: Never                  2/23/2024   STI Screening   New sexual partner(s) since last STI/HIV test? No     History of abnormal Pap smear: NO - age 30-65 PAP every 5 years with negative HPV co-testing recommended        5/6/2016    12:00 AM   PAP / HPV   PAP-ABSTRACT See Scanned Document           This result is from an external source.     ASCVD Risk   The ASCVD Risk score (Conchita VASQUEZ, et al., 2019) failed to calculate for the following reasons:    Cannot find a previous HDL lab    Cannot find a previous total cholesterol lab        2/23/2024   Contraception/Family Planning   Questions about contraception or family planning (!) YES         Reviewed and updated as needed this visit by Provider                    Past Medical History:   Diagnosis Date    Type 2 diabetes mellitus (H)      Past Surgical History:   Procedure Laterality Date    APPENDECTOMY           Review of Systems  CONSTITUTIONAL: NEGATIVE for fever, chills, change in weight  EYES: NEGATIVE for vision changes or irritation  ENT/MOUTH: NEGATIVE for ear, mouth and throat problems  RESP: NEGATIVE for significant cough or SOB  CV: NEGATIVE for chest pain, palpitations or peripheral edema  GI: NEGATIVE for nausea, abdominal pain, heartburn, or change in bowel habits  NEURO: NEGATIVE for weakness, dizziness or paresthesias  ENDOCRINE: NEGATIVE for temperature intolerance, skin/hair changes  PSYCHIATRIC: NEGATIVE for changes in mood or affect     Objective    Exam  /84 (BP Location: Right arm, Patient Position: Chair, Cuff Size: Adult Regular)   Pulse 63   Temp 97.9  F (36.6  C) (Oral)   Resp 16   Ht 1.651 m (5' 5\")   Wt 85.7 kg (189 lb)   LMP 02/01/2024   " "SpO2 99%   Breastfeeding No   BMI 31.45 kg/m     Estimated body mass index is 31.45 kg/m  as calculated from the following:    Height as of this encounter: 1.651 m (5' 5\").    Weight as of this encounter: 85.7 kg (189 lb).    Physical Exam  GENERAL: alert and no distress  NECK: no adenopathy, no asymmetry, masses, or scars  RESP: lungs clear to auscultation - no rales, rhonchi or wheezes  CV: regular rate and rhythm, normal S1 S2, no S3 or S4, no murmur, click or rub, no peripheral edema  ABDOMEN: soft, nontender, no hepatosplenomegaly, no masses and bowel sounds normal  MS: no gross musculoskeletal defects noted, no edema  SKIN: no suspicious lesions or rashes  NEURO: Normal strength and tone, mentation intact and speech normal  PSYCH: mentation appears normal, affect normal/bright  Pelvic - pap obtained and send .    Signed Electronically by: Adriane Smyth MD    "

## 2024-02-26 ENCOUNTER — VIRTUAL VISIT (OUTPATIENT)
Dept: EDUCATION SERVICES | Facility: CLINIC | Age: 44
End: 2024-02-26
Payer: COMMERCIAL

## 2024-02-26 ENCOUNTER — TELEPHONE (OUTPATIENT)
Dept: FAMILY MEDICINE | Facility: CLINIC | Age: 44
End: 2024-02-26

## 2024-02-26 DIAGNOSIS — E11.9 TYPE 2 DIABETES MELLITUS WITHOUT COMPLICATION, WITHOUT LONG-TERM CURRENT USE OF INSULIN (H): Primary | ICD-10-CM

## 2024-02-26 PROCEDURE — 98968 PH1 ASSMT&MGMT NQHP 21-30: CPT | Mod: 93 | Performed by: DIETITIAN, REGISTERED

## 2024-02-26 NOTE — TELEPHONE ENCOUNTER
Patient advised of MD message.  She already has the Metformin prescription and has already scheduled her 3 month appointment.  JORGE LUIS Wilson R.N.

## 2024-02-26 NOTE — LETTER
2/26/2024         RE: Jo Ann Mckeon  [address] [facility] [address]        Dear Colleague,    Thank you for referring your patient, Jo Ann Mckeon, to the [facility]. Please see a copy of my visit note below.    Diabetes Self-Management Education & Support  Presents for: Follow-up    Type of Service: Telephone Visit    Originating Location (Patient Location): Home  Distant Location (Provider Location): Offsite  Mode of Communication:  Telephone    Telephone Visit Start Time:  1015  Telephone Visit End Time (telephone visit stop time): 1037    ASSESSMENT:  Jo Ann states most of the time BG is between 120-140 mg/dL.   After meal BG is ~160 mg/dL  Reports that Metformin XR was prescribed and she hasn't started it yet. Reviewed Metformin XR prescription. Jo Ann shares she is hoping to get pregnant and plans to get back on track with meal plan and exercise. States she will start the first Metformin 500 mg tablet tonight and titrate as prescribed. Discussed importance of good blood sugar control prior to pregnancy.  Completed diabetes self management education topics.     Patient's most recent   Lab Results   Component Value Date    A1C 7.3 02/23/2024     is not meeting goal of <7.0    Diabetes knowledge and skills assessment:   Patient is knowledgeable in diabetes management concepts related to: Healthy Eating, Being Active, Monitoring, Problem Solving, and Reducing Risks    Continue education with the following diabetes management concepts: Taking Medication    Based on learning assessment above, most appropriate setting for further diabetes education would be: Individual setting.      PLAN    Diabetes Medications:  Start Metformin as prescribed.     Blood sugar monitoring:  Check blood sugars fasting and 2 hours after meal. Pick one after meal test per day and rotate between meals  Fasting and before meal targets:  mg/dL  2 hours after start of meal: <180 mg/dL    Meal Plan  "Recommendation:   Use portion control, plate planning method and carbohydrate counting.    Exercise / activity plan:   Recommend starting slow and increasing as tolerated to goal of 150 minutes per week.     Follow-up: with PCP. She will call diabetes triage with questions     See Care Plan for co-developed, patient-state behavior change goals.    SUBJECTIVE/OBJECTIVE:  Presents for: Follow-up  Accompanied by: Self  Diabetes education in the past 24mo: No  Focus of Visit: Monitoring, Healthy Eating, Taking Medication  Diabetes type: Type 2  Date of diagnosis: reports diagnosed with Type 2 diabetes this pregnancy  Disease course: Stable  How confident are you filling out medical forms by yourself:: Not Assessed  Diabetes management related comments/concerns: My blood sugars were a little higher than last time. My A1c is 7.3% and so Metformin was prescribed but I haven't started it yet.  Transportation concerns: No  Difficulty affording diabetes testing supplies?: Yes (the test strips were expensive)  Other concerns:: None  Cultural Influences/Ethnic Background:  Not  or     Diabetes Symptoms & Complications:  Weight trend: Stable  Symptom course: Stable  Disease course: Stable  Complications assessed today?: No    Patient Problem List and Family Medical History reviewed for relevant medical history, current medical status, and diabetes risk factors.    Vitals:  LMP 02/01/2024   Estimated body mass index is 31.45 kg/m  as calculated from the following:    Height as of 2/23/24: 1.651 m (5' 5\").    Weight as of 2/23/24: 85.7 kg (189 lb).   Last 3 BP:   BP Readings from Last 3 Encounters:   02/23/24 127/84   11/24/23 114/70   10/16/23 118/62       History   Smoking Status     Never   Smokeless Tobacco     Never       Labs:  Lab Results   Component Value Date    A1C 7.3 02/23/2024     Lab Results   Component Value Date     02/23/2024    GLC 95 12/29/2018     Lab Results   Component Value Date    LDL " "97 02/23/2024     Direct Measure HDL   Date Value Ref Range Status   02/23/2024 40 (L) >=50 mg/dL Final   ]  GFR Estimate   Date Value Ref Range Status   02/23/2024 >90 >60 mL/min/1.73m2 Final   12/29/2018 >90 >60 mL/min/[1.73_m2] Final     Comment:     Non  GFR Calc  Starting 12/18/2018, serum creatinine based estimated GFR (eGFR) will be   calculated using the Chronic Kidney Disease Epidemiology Collaboration   (CKD-EPI) equation.       GFR Estimate If Black   Date Value Ref Range Status   12/29/2018 >90 >60 mL/min/[1.73_m2] Final     Comment:      GFR Calc  Starting 12/18/2018, serum creatinine based estimated GFR (eGFR) will be   calculated using the Chronic Kidney Disease Epidemiology Collaboration   (CKD-EPI) equation.       Lab Results   Component Value Date    CR 0.58 02/23/2024    CR 0.81 12/29/2018     No results found for: \"MICROALBUMIN\"    Healthy Eating:  Healthy Eating Assessed Today: Yes  Cultural/Orthodoxy diet restrictions?: No  Meal planning/habits: Low carbohydrate, Plate planning method  Who cooks/prepares meals for you?: Self  Who purchases food in  your home?: Self  How many times a week on average do you eat food made away from home (restaurant/take-out)?:  (1-2)  Meals include: Breakfast, Lunch, Dinner, Morning Snack, Afternoon Snack  Breakfast: 8-9:30 AM: 2 eggs with cinera (1/2), coffee with milk  Lunch: 12-2 PM: injera with beef in sauce OR injera with lentils, ICE sparkling water OR Vitamin Water Zero  Dinner: 7-7:30 PM: last night = rice with vegetables and chicken, ICE sparkling water OR Vitamin Water Zero  Snacks: AM and/or PM: sometimes 1/2 liam, orange, banana, peanuts, OR roast wheat with peanuts; HS: last night = peanuts  Other: up for day at 5:30 AM (on school days); to bed at 11 PM  Beverages: Water, Coffee, Milk  Has patient met with a dietitian in the past?: Yes    Being Active:  Being Active Assessed Today: Yes  Exercise:: Currently not " exercising (ADLs, occasional walk outside)  Barrier to exercise: None    Monitoring:  Monitoring Assessed Today: Yes  Did patient bring glucose meter to appointment? : Yes  Blood Glucose Meter: Accu-chek  Times checking blood sugar at home (number): 4 (1-2)  Times checking blood sugar at home (per): Day  Blood glucose trend: No change    Taking Medications:  Diabetes Medication(s)       Biguanides       metFORMIN (GLUCOPHAGE XR) 500 MG 24 hr tablet Take 1 tablet (500 mg) by mouth 2 times daily (with meals)            Taking Medication Assessed Today: No  Current Treatments: Diet, Oral Medication (taken by mouth) (Has not started Metformin.)    Problem Solving:  Problem Solving Assessed Today: Yes  Is the patient at risk for hypoglycemia?: No    Reducing Risks:  Reducing Risks Assessed Today: Yes  Diabetes Risks: History of gestational diabetes, Ethnicity  Additional female risks: Gestational Diabetes (in 3 previous pregnancies)  CAD Risks: Diabetes Mellitus  Has dilated eye exam at least once a year?: No  Sees dentist every 6 months?: No (last 3-4 years ago)    Healthy Coping:  Healthy Coping Assessed Today: Yes  Emotional response to diabetes: Ready to learn  Informal Support system:: Spouse  Stage of change: ACTION (Actively working towards change)  Patient Activation Measure Survey Score:       No data to display              Care Plan and Education Provided:  Care Plan: Diabetes   Updates made by Glory Sosa RD since 2/26/2024 12:00 AM        Problem: Diabetes Self-Management Education Needed to Optimize Self-Care Behaviors         Goal: Understand diabetes pathophysiology and disease progression         Task: Provide education on diabetes pathophysiology and disease progression specfic to patient's diabetes type Completed 2/26/2024   Responsible User: Francisca Garner RD        Goal: Monitoring - monitor glucose and ketones as directed Completed 2/26/2024   Recent Progress: 80%   Note:    Test glucose  fasting and 2 hours after one meal, rotating after meals       Goal: Taking Medication - patient is consistently taking medications as directed         Task: Provide education on action of prescribed medication, including when to take and possible side effects Completed 2/26/2024   Responsible User: Francisca Garner RD        Goal: Reducing Risks - know how to prevent and treat long-term diabetes complications         Task: Provide education on major complications of diabetes, prevention, early diagnostic measures and treatment of complications Completed 2/26/2024   Responsible User: Francisca Garner RD        Task: Provide education on recommended care for dental, eye and foot health Completed 2/26/2024   Responsible User: Francisca Garner RD        Task: Provide education on recommendations for heart health - lipid levels and goals, blood pressure and goals, and aspirin therapy, if indicated Completed 2/26/2024   Responsible User: Francisca Garner RD Melissa Klohn RDN, LD, [facility]    Time Spent: 22 minutes  Encounter Type: Individual    Any diabetes medication dose changes were made via the CDE Protocol per the patient's referring provider. A copy of this encounter was shared with the provider.

## 2024-02-26 NOTE — PROGRESS NOTES
Diabetes Self-Management Education & Support  Presents for: Follow-up    Type of Service: Telephone Visit    Originating Location (Patient Location): Home  Distant Location (Provider Location): Offsite  Mode of Communication:  Telephone    Telephone Visit Start Time:  1015  Telephone Visit End Time (telephone visit stop time): 1037    ASSESSMENT:  Jo Ann states most of the time BG is between 120-140 mg/dL.   After meal BG is ~160 mg/dL  Reports that Metformin XR was prescribed and she hasn't started it yet. Reviewed Metformin XR prescription. Jo Ann shares she is hoping to get pregnant and plans to get back on track with meal plan and exercise. States she will start the first Metformin 500 mg tablet tonight and titrate as prescribed. Discussed importance of good blood sugar control prior to pregnancy.  Completed diabetes self management education topics.     Patient's most recent   Lab Results   Component Value Date    A1C 7.3 02/23/2024     is not meeting goal of <7.0    Diabetes knowledge and skills assessment:   Patient is knowledgeable in diabetes management concepts related to: Healthy Eating, Being Active, Monitoring, Problem Solving, and Reducing Risks    Continue education with the following diabetes management concepts: Taking Medication    Based on learning assessment above, most appropriate setting for further diabetes education would be: Individual setting.      PLAN    Diabetes Medications:  Start Metformin as prescribed.     Blood sugar monitoring:  Check blood sugars fasting and 2 hours after meal. Pick one after meal test per day and rotate between meals  Fasting and before meal targets:  mg/dL  2 hours after start of meal: <180 mg/dL    Meal Plan Recommendation:   Use portion control, plate planning method and carbohydrate counting.    Exercise / activity plan:   Recommend starting slow and increasing as tolerated to goal of 150 minutes per week.     Follow-up: with PCP. She will call diabetes  "triage with questions     See Care Plan for co-developed, patient-state behavior change goals.    SUBJECTIVE/OBJECTIVE:  Presents for: Follow-up  Accompanied by: Self  Diabetes education in the past 24mo: No  Focus of Visit: Monitoring, Healthy Eating, Taking Medication  Diabetes type: Type 2  Date of diagnosis: reports diagnosed with Type 2 diabetes this pregnancy  Disease course: Stable  How confident are you filling out medical forms by yourself:: Not Assessed  Diabetes management related comments/concerns: My blood sugars were a little higher than last time. My A1c is 7.3% and so Metformin was prescribed but I haven't started it yet.  Transportation concerns: No  Difficulty affording diabetes testing supplies?: Yes (the test strips were expensive)  Other concerns:: None  Cultural Influences/Ethnic Background:  Not  or     Diabetes Symptoms & Complications:  Weight trend: Stable  Symptom course: Stable  Disease course: Stable  Complications assessed today?: No    Patient Problem List and Family Medical History reviewed for relevant medical history, current medical status, and diabetes risk factors.    Vitals:  LMP 02/01/2024   Estimated body mass index is 31.45 kg/m  as calculated from the following:    Height as of 2/23/24: 1.651 m (5' 5\").    Weight as of 2/23/24: 85.7 kg (189 lb).   Last 3 BP:   BP Readings from Last 3 Encounters:   02/23/24 127/84   11/24/23 114/70   10/16/23 118/62       History   Smoking Status    Never   Smokeless Tobacco    Never       Labs:  Lab Results   Component Value Date    A1C 7.3 02/23/2024     Lab Results   Component Value Date     02/23/2024    GLC 95 12/29/2018     Lab Results   Component Value Date    LDL 97 02/23/2024     Direct Measure HDL   Date Value Ref Range Status   02/23/2024 40 (L) >=50 mg/dL Final   ]  GFR Estimate   Date Value Ref Range Status   02/23/2024 >90 >60 mL/min/1.73m2 Final   12/29/2018 >90 >60 mL/min/[1.73_m2] Final     Comment:     " "Non  GFR Calc  Starting 12/18/2018, serum creatinine based estimated GFR (eGFR) will be   calculated using the Chronic Kidney Disease Epidemiology Collaboration   (CKD-EPI) equation.       GFR Estimate If Black   Date Value Ref Range Status   12/29/2018 >90 >60 mL/min/[1.73_m2] Final     Comment:      GFR Calc  Starting 12/18/2018, serum creatinine based estimated GFR (eGFR) will be   calculated using the Chronic Kidney Disease Epidemiology Collaboration   (CKD-EPI) equation.       Lab Results   Component Value Date    CR 0.58 02/23/2024    CR 0.81 12/29/2018     No results found for: \"MICROALBUMIN\"    Healthy Eating:  Healthy Eating Assessed Today: Yes  Cultural/Buddhist diet restrictions?: No  Meal planning/habits: Low carbohydrate, Plate planning method  Who cooks/prepares meals for you?: Self  Who purchases food in  your home?: Self  How many times a week on average do you eat food made away from home (restaurant/take-out)?:  (1-2)  Meals include: Breakfast, Lunch, Dinner, Morning Snack, Afternoon Snack  Breakfast: 8-9:30 AM: 2 eggs with cinera (1/2), coffee with milk  Lunch: 12-2 PM: injera with beef in sauce OR injera with lentils, ICE sparkling water OR Vitamin Water Zero  Dinner: 7-7:30 PM: last night = rice with vegetables and chicken, ICE sparkling water OR Vitamin Water Zero  Snacks: AM and/or PM: sometimes 1/2 liam, orange, banana, peanuts, OR roast wheat with peanuts; HS: last night = peanuts  Other: up for day at 5:30 AM (on school days); to bed at 11 PM  Beverages: Water, Coffee, Milk  Has patient met with a dietitian in the past?: Yes    Being Active:  Being Active Assessed Today: Yes  Exercise:: Currently not exercising (ADLs, occasional walk outside)  Barrier to exercise: None    Monitoring:  Monitoring Assessed Today: Yes  Did patient bring glucose meter to appointment? : Yes  Blood Glucose Meter: Accu-chek  Times checking blood sugar at home (number): 4 " (1-2)  Times checking blood sugar at home (per): Day  Blood glucose trend: No change    Taking Medications:  Diabetes Medication(s)       Biguanides       metFORMIN (GLUCOPHAGE XR) 500 MG 24 hr tablet Take 1 tablet (500 mg) by mouth 2 times daily (with meals)            Taking Medication Assessed Today: No  Current Treatments: Diet, Oral Medication (taken by mouth) (Has not started Metformin.)    Problem Solving:  Problem Solving Assessed Today: Yes  Is the patient at risk for hypoglycemia?: No    Reducing Risks:  Reducing Risks Assessed Today: Yes  Diabetes Risks: History of gestational diabetes, Ethnicity  Additional female risks: Gestational Diabetes (in 3 previous pregnancies)  CAD Risks: Diabetes Mellitus  Has dilated eye exam at least once a year?: No  Sees dentist every 6 months?: No (last 3-4 years ago)    Healthy Coping:  Healthy Coping Assessed Today: Yes  Emotional response to diabetes: Ready to learn  Informal Support system:: Spouse  Stage of change: ACTION (Actively working towards change)  Patient Activation Measure Survey Score:       No data to display              Care Plan and Education Provided:  Care Plan: Diabetes   Updates made by Glory Sosa RD since 2/26/2024 12:00 AM        Problem: Diabetes Self-Management Education Needed to Optimize Self-Care Behaviors         Goal: Understand diabetes pathophysiology and disease progression         Task: Provide education on diabetes pathophysiology and disease progression specfic to patient's diabetes type Completed 2/26/2024   Responsible User: Francisca Garner RD        Goal: Monitoring - monitor glucose and ketones as directed Completed 2/26/2024   Recent Progress: 80%   Note:    Test glucose fasting and 2 hours after one meal, rotating after meals       Goal: Taking Medication - patient is consistently taking medications as directed         Task: Provide education on action of prescribed medication, including when to take and possible  side effects Completed 2/26/2024   Responsible User: Francisca Garner RD        Goal: Reducing Risks - know how to prevent and treat long-term diabetes complications         Task: Provide education on major complications of diabetes, prevention, early diagnostic measures and treatment of complications Completed 2/26/2024   Responsible User: Francisca Garner RD        Task: Provide education on recommended care for dental, eye and foot health Completed 2/26/2024   Responsible User: Francisca Garner RD        Task: Provide education on recommendations for heart health - lipid levels and goals, blood pressure and goals, and aspirin therapy, if indicated Completed 2/26/2024   Responsible User: Francisca Garner RD Melissa Klohn RDN, LD, [facility]    Time Spent: 22 minutes  Encounter Type: Individual    Any diabetes medication dose changes were made via the CDE Protocol per the patient's referring provider. A copy of this encounter was shared with the provider.

## 2024-02-26 NOTE — TELEPHONE ENCOUNTER
----- Message from Adriane Smyth MD sent at 2/25/2024 11:58 AM CST -----  Team     Please inform patient sugars are above goal .   Recommend regular metformin as discussed in appointment .  Recommend 6 months clinic follow up .     Thanks   Adriane Smyth MD.

## 2024-02-28 LAB
BKR LAB AP GYN ADEQUACY: NORMAL
BKR LAB AP GYN INTERPRETATION: NORMAL
BKR LAB AP HPV REFLEX: NORMAL
BKR LAB AP LMP: NORMAL
BKR LAB AP PREVIOUS ABNORMAL: NORMAL
PATH REPORT.COMMENTS IMP SPEC: NORMAL
PATH REPORT.COMMENTS IMP SPEC: NORMAL
PATH REPORT.RELEVANT HX SPEC: NORMAL

## 2024-03-01 LAB
HUMAN PAPILLOMA VIRUS 16 DNA: NEGATIVE
HUMAN PAPILLOMA VIRUS 18 DNA: NEGATIVE
HUMAN PAPILLOMA VIRUS FINAL DIAGNOSIS: NORMAL
HUMAN PAPILLOMA VIRUS OTHER HR: NEGATIVE

## 2024-03-13 ENCOUNTER — NURSE TRIAGE (OUTPATIENT)
Dept: FAMILY MEDICINE | Facility: CLINIC | Age: 44
End: 2024-03-13
Payer: COMMERCIAL

## 2024-03-13 NOTE — TELEPHONE ENCOUNTER
"Nurse Triage SBAR    Is this a 2nd Level Triage? NO    Situation: Patient reports eye problems x 2 weeks.     Background: Patient has appointment in April with primary care provider. Eye symptoms x 2 weeks.     Assessment: Patient reports bilateral eyes seem red for two weeks. Patient reports she feels like something is stuck in her eye. Patient reports headache today and yesterday. Patient reports eyes feel irritated, itchy, and blurry.     Patient reports redness in left eye currently with some eye discharge as well. Lower eyelid seems puffy.     Tx: Ibuprofen for headache,     Protocol Recommended Disposition:   GO TO OFFICE NOW:     Recommendation: Recommend to be seen today due to pain level and duration of symptoms. Patient doesn't want to go to urgent care. Patient requesting morning appointment tomorrow at [place]. No appointment's available. Schedule with [facility] tomorrow.    Appointments in Next Year      Mar 14, 2024  9:30 AM  (Arrive by 9:10 AM)  Provider Visit with Alicia Bennett MD  [facility] ) [phone]       Does the patient meet one of the following criteria for ADS visit consideration? 16+ years old, with an [facility] PCP     TIP  Providers, please consider if this condition is appropriate for management at one of our Acute and Diagnostic Services sites.     If patient is a good candidate, please use dotphrase <dot>triageresponse and select Refer to ADS to document.  Reason for Disposition   MODERATE eye pain (e.g., interferes with normal activities)    Additional Information   Negative: Eye exposure to chemical or fumes   Negative: Redness of white of eye (sclera), but no pus or only a small amount of brief pus   Negative: SEVERE pain (e.g., excruciating)   Negative: Patient sounds very sick or weak to the triager    Answer Assessment - Initial Assessment Questions  1. EYE DISCHARGE: \"Is the discharge in one or both " "eyes?\" \"What color is it?\" \"How much is there?\" \"When did the discharge start?\"       Both eyes, but left eye currently     2. REDNESS OF SCLERA: \"Is the redness in one or both eyes?\" \"When did the redness start?\"       Both, but left currently.    3. EYELIDS: \"Are the eyelids red or swollen?\" If Yes, ask: \"How much?\"       Bottom of eyelid red/swelled    4. VISION: \"Is there any difficulty seeing clearly?\"       Some blurry vision  5. PAIN: \"Is there any pain? If Yes, ask: \"How bad is it?\" (Scale 1-10; or mild, moderate, severe)     - MILD (1-3): doesn't interfere with normal activities      - MODERATE (4-7): interferes with normal activities or awakens from sleep     - SEVERE (8-10): excruciating pain, unable to do any normal activities        7/10    6. CONTACT LENS: \"Do you wear contacts?\"      No    7. OTHER SYMPTOMS: \"Do you have any other symptoms?\" (e.g., fever, runny nose, cough)      Headache.    8. PREGNANCY: \"Is there any chance you are pregnant?\" \"When was your last menstrual period?\"      No, LMP unknown, patient doesn't know.    Protocols used: Eye - Pus or [id number]    "

## 2024-03-14 ENCOUNTER — OFFICE VISIT (OUTPATIENT)
Dept: FAMILY MEDICINE | Facility: CLINIC | Age: 44
End: 2024-03-14
Payer: COMMERCIAL

## 2024-03-14 VITALS
HEIGHT: 65 IN | OXYGEN SATURATION: 100 % | WEIGHT: 193.1 LBS | HEART RATE: 73 BPM | BODY MASS INDEX: 32.17 KG/M2 | DIASTOLIC BLOOD PRESSURE: 77 MMHG | SYSTOLIC BLOOD PRESSURE: 124 MMHG | RESPIRATION RATE: 16 BRPM | TEMPERATURE: 97.8 F

## 2024-03-14 DIAGNOSIS — H10.13 ALLERGIC CONJUNCTIVITIS OF BOTH EYES: Primary | ICD-10-CM

## 2024-03-14 PROCEDURE — 99213 OFFICE O/P EST LOW 20 MIN: CPT | Performed by: FAMILY MEDICINE

## 2024-03-14 RX ORDER — OLOPATADINE HYDROCHLORIDE 1 MG/ML
1 SOLUTION/ DROPS OPHTHALMIC 2 TIMES DAILY
Qty: 5 ML | Refills: 0 | Status: SHIPPED | OUTPATIENT
Start: 2024-03-14 | End: 2024-07-29

## 2024-03-14 ASSESSMENT — ENCOUNTER SYMPTOMS: EYE PAIN: 1

## 2024-03-14 ASSESSMENT — PAIN SCALES - GENERAL: PAINLEVEL: MODERATE PAIN (4)

## 2024-03-14 NOTE — PATIENT INSTRUCTIONS
Thank you for choosing us for your care. I have placed an order for a prescription so that you can start treatment. View your full visit summary for details by clicking on the link below. Your pharmacist will able to address any questions you may have about the medication.     If you re not feeling better within 2-3 days, please schedule an appointment.  You can schedule an appointment right here in [facility], or call [phone]  If the visit is for the same symptoms as your eVisit, we ll refund the cost of your eVisit if seen within seven days.

## 2024-03-14 NOTE — PROGRESS NOTES
Assessment & Plan     Allergic conjunctivitis of both eyes  If worsens or meds do not help, please follow-up with eye provider and send Virtual Paper message update  - olopatadine (PATANOL) 0.1 % ophthalmic solution; Place 1 drop into both eyes 2 times daily    Prescription drug management              Subjective   Jo Ann is a 43 year old, presenting for the following health issues:  Eye Problem        3/14/2024     9:22 AM   Additional Questions   Roomed by Georgette JOHNSON CMA     History of Present Illness       Reason for visit:  Eye  Symptom onset:  1-2 weeks ago  Symptoms include:  Rednes,feeling somenting inside  Symptom intensity:  Moderate  Symptom progression:  Staying the same  Had these symptoms before:  No  What makes it worse:  No  What makes it better:  Rubing    She eats 2-3 servings of fruits and vegetables daily.She consumes 0 sweetened beverage(s) daily.She exercises with enough effort to increase her heart rate 20 to 29 minutes per day.  She exercises with enough effort to increase her heart rate 3 or less days per week. She is missing 2 dose(s) of medications per week.     Eye(s) Problem  Onset/Duration: 2 weeks, seen 2 weeks ago by pcp and gave her eye abx drops and doo this for 2 days  Now both eyes, and now headaches   No sneezing or drippy nose  Description:   Location: Bilateral, mostly left eye now  Pain: YES, irritated and burning  Redness: YES  Accompanying Signs & Symptoms:  Discharge/mattering: YES  Swelling: YES  Visual changes: YES- Blurry  Fever: No  Nasal Congestion: No  Bothered by bright lights: No  History:  Trauma: No  Foreign body exposure: Feels like there is something in her eye  Wearing contacts: No  Precipitating or alleviating factors: Rubbing  Therapies tried and outcome: OTC drops        Review of Systems  CONSTITUTIONAL: NEGATIVE for fever, chills, change in weight  ENT/MOUTH: NEGATIVE for ear, mouth and throat problems(scratchy, throat clearing only)  RESP: NEGATIVE for  "significant cough or SOB  CV: NEGATIVE for chest pain, palpitations or peripheral edema      Objective    Ht 1.651 m (5' 5\")   LMP 02/01/2024   BMI 31.45 kg/m    Body mass index is 31.45 kg/m .  Physical Exam   GENERAL: alert and no distress  EYES: Eyes grossly normal to inspection, PERRL and conjunctivae-injected and sclerae normal  HENT: ear canals and TM's normal, nose and mouth without ulcers or lesions  OP-cobblestone appearance  NECK: no adenopathy, no asymmetry, masses, or scars  RESP: lungs clear to auscultation - no rales, rhonchi or wheezes  CV: regular rate and rhythm, normal S1 S2, no S3 or S4, no murmur, click or rub, no peripheral edema  MS: no gross musculoskeletal defects noted, no edema            Signed Electronically by: Alicia Bennett MD    "

## 2024-05-15 ENCOUNTER — OFFICE VISIT (OUTPATIENT)
Dept: OPTOMETRY | Facility: CLINIC | Age: 44
End: 2024-05-15
Payer: COMMERCIAL

## 2024-05-15 DIAGNOSIS — E11.9 TYPE 2 DIABETES MELLITUS WITHOUT RETINOPATHY (H): Primary | ICD-10-CM

## 2024-05-15 DIAGNOSIS — H52.4 PRESBYOPIA: ICD-10-CM

## 2024-05-15 DIAGNOSIS — E11.9 TYPE 2 DIABETES MELLITUS WITHOUT COMPLICATION, WITHOUT LONG-TERM CURRENT USE OF INSULIN (H): ICD-10-CM

## 2024-05-15 PROCEDURE — 92015 DETERMINE REFRACTIVE STATE: CPT | Performed by: OPTOMETRIST

## 2024-05-15 PROCEDURE — 92004 COMPRE OPH EXAM NEW PT 1/>: CPT | Performed by: OPTOMETRIST

## 2024-05-15 ASSESSMENT — REFRACTION_MANIFEST
METHOD_AUTOREFRACTION: 1
OD_CYLINDER: +0.50
OD_SPHERE: -1.00
OD_AXIS: 022
OD_ADD: +0.75
OS_SPHERE: -0.50
OS_AXIS: 178
OS_CYLINDER: +0.50
OD_SPHERE: PLANO
OS_SPHERE: PLANO

## 2024-05-15 ASSESSMENT — KERATOMETRY
OD_K1POWER_DIOPTERS: 43.12
OS_K2POWER_DIOPTERS: 43.12
OS_AXISANGLE_DEGREES: 76
OS_AXISANGLE2_DEGREES: 166
OD_AXISANGLE2_DEGREES: 156
OD_K2POWER_DIOPTERS: 44
OS_K1POWER_DIOPTERS: 42.50
OD_AXISANGLE_DEGREES: 66

## 2024-05-15 ASSESSMENT — CONF VISUAL FIELD
OS_INFERIOR_TEMPORAL_RESTRICTION: 0
OD_INFERIOR_TEMPORAL_RESTRICTION: 0
OD_NORMAL: 1
OS_NORMAL: 1
OD_INFERIOR_NASAL_RESTRICTION: 0
OS_SUPERIOR_NASAL_RESTRICTION: 0
OD_SUPERIOR_NASAL_RESTRICTION: 0
OS_SUPERIOR_TEMPORAL_RESTRICTION: 0
OS_INFERIOR_NASAL_RESTRICTION: 0
OD_SUPERIOR_TEMPORAL_RESTRICTION: 0
METHOD: COUNTING FINGERS

## 2024-05-15 ASSESSMENT — EXTERNAL EXAM - RIGHT EYE: OD_EXAM: NORMAL

## 2024-05-15 ASSESSMENT — SLIT LAMP EXAM - LIDS
COMMENTS: NORMAL
COMMENTS: NORMAL

## 2024-05-15 ASSESSMENT — VISUAL ACUITY
OD_SC: 20/30-1
OS_SC: 20/20
OS_SC: 20/30-2
OD_SC: 20/25
METHOD: SNELLEN - LINEAR

## 2024-05-15 ASSESSMENT — TONOMETRY
IOP_METHOD: APPLANATION
OD_IOP_MMHG: 17
OS_IOP_MMHG: 17

## 2024-05-15 ASSESSMENT — CUP TO DISC RATIO
OD_RATIO: 0.0
OS_RATIO: 0.1

## 2024-05-15 ASSESSMENT — EXTERNAL EXAM - LEFT EYE: OS_EXAM: NORMAL

## 2024-05-15 NOTE — LETTER
5/15/2024         RE: Jo Ann Mckeon  [address] [facility] [address]        Dear Colleague,    Thank you for referring your patient, Jo Ann Mckeon, to [facility]. Please see a copy of my visit note below.    Chief Complaint   Patient presents with     Diabetic Eye Exam       Lab Results   Component Value Date    A1C 7.3 02/23/2024    A1C 6.8 11/17/2023            Last Eye Exam: 1st eye exam    Dilated Previously: No, side effects of dilation explained today    What are you currently using to see?  does not use glasses or contacts    Distance Vision Acuity: Satisfied with vision unaided     Near Vision Acuity: Not having trouble unless very close    Eye Comfort: itchy, not anymore   Do you use eye drops? : No      Marilee Hood - Optometric Assistant      Medical, surgical and family histories reviewed and updated 5/15/2024.       OBJECTIVE: See Ophthalmology exam    ASSESSMENT:    ICD-10-CM    1. Type 2 diabetes mellitus without retinopathy (H)  E11.9 EYE EXAM (SIMPLE-NONBILLABLE)      2. Type 2 diabetes mellitus without complication, without long-term current use of insulin (H)  E11.9 Adult Eye  Referral     EYE EXAM (SIMPLE-NONBILLABLE)      3. Presbyopia  H52.4 REFRACTION        Emerging presbyopia    PLAN:  No readers at this time she is not having trouble  Glucose control to prevent vision loss discussed     Jo Ann Mckeon aware  eye exam results will be sent to Adriane Smyth.    Marion Fry OD          Again, thank you for allowing me to participate in the care of your patient.        Sincerely,        Marion Fry, OD

## 2024-05-15 NOTE — PATIENT INSTRUCTIONS
Patient Education   Diabetes weakens the blood vessels all over the body, including the eyes. Damage to the blood vessels in the eyes can cause swelling or bleeding into part of the eye (called the retina). This is called diabetic retinopathy (ED-tin--pu-thee). If not treated, this disease can cause vision loss or blindness.   Symptoms may include blurred or distorted vision, but many people have no symptoms. It's important to see your eye doctor regularly to check for problems.   Early treatment and good control can help protect your vision. Here are the things you can do to help prevent vision loss:      1. Keep your blood sugar levels under tight control.      2. Bring high blood pressure under control.      3. No smoking.      4. Have yearly dilated eye exams.

## 2024-07-29 ENCOUNTER — OFFICE VISIT (OUTPATIENT)
Dept: FAMILY MEDICINE | Facility: CLINIC | Age: 44
End: 2024-07-29
Payer: COMMERCIAL

## 2024-07-29 VITALS
HEART RATE: 73 BPM | WEIGHT: 187 LBS | DIASTOLIC BLOOD PRESSURE: 84 MMHG | TEMPERATURE: 98.6 F | SYSTOLIC BLOOD PRESSURE: 120 MMHG | OXYGEN SATURATION: 98 % | RESPIRATION RATE: 16 BRPM | BODY MASS INDEX: 31.16 KG/M2 | HEIGHT: 65 IN

## 2024-07-29 DIAGNOSIS — R14.0 ABDOMINAL BLOATING: ICD-10-CM

## 2024-07-29 DIAGNOSIS — E11.9 TYPE 2 DIABETES MELLITUS WITHOUT COMPLICATION, WITHOUT LONG-TERM CURRENT USE OF INSULIN (H): Primary | ICD-10-CM

## 2024-07-29 DIAGNOSIS — Z13.9 SCREENING FOR CONDITION: ICD-10-CM

## 2024-07-29 LAB — HBA1C MFR BLD: 6.7 % (ref 0–5.6)

## 2024-07-29 PROCEDURE — 36415 COLL VENOUS BLD VENIPUNCTURE: CPT | Performed by: FAMILY MEDICINE

## 2024-07-29 PROCEDURE — 99214 OFFICE O/P EST MOD 30 MIN: CPT | Performed by: FAMILY MEDICINE

## 2024-07-29 PROCEDURE — 83036 HEMOGLOBIN GLYCOSYLATED A1C: CPT | Performed by: FAMILY MEDICINE

## 2024-07-29 RX ORDER — DICYCLOMINE HYDROCHLORIDE 10 MG/1
10 CAPSULE ORAL
Qty: 90 CAPSULE | Refills: 0 | Status: SHIPPED | OUTPATIENT
Start: 2024-07-29

## 2024-07-29 RX ORDER — METFORMIN HCL 500 MG
500 TABLET, EXTENDED RELEASE 24 HR ORAL 2 TIMES DAILY WITH MEALS
Qty: 180 TABLET | Refills: 1 | Status: SHIPPED | OUTPATIENT
Start: 2024-07-29

## 2024-07-29 ASSESSMENT — PAIN SCALES - GENERAL: PAINLEVEL: NO PAIN (0)

## 2024-07-29 NOTE — PROGRESS NOTES
Assessment & Plan     (E11.9) Type 2 diabetes mellitus without complication, without long-term current use of insulin (H)  (primary encounter diagnosis)  Comment: Discussed healthy eating   Discussed maintaining ideal weight   Discussed regular exercise .   Recommend to continue metformin   Plan: metFORMIN (GLUCOPHAGE XR) 500 MG 24 hr tablet,           Lab Results   Component Value Date    A1C 6.7 07/29/2024    A1C 7.3 02/23/2024    A1C 6.8 11/17/2023         (R14.0) Abdominal bloating  Comment: likely due to IBS .Discussed diet modification .   Discussed diagnosis in detail .    Plan: dicyclomine (BENTYL) 10 MG capsule            (Z13.9) Screening for condition  Comment:   Plan: MA Screen Bilateral w/Donald            Subjective   Jo Ann is a 44 year old, presenting for the following health issues:  Diabetes (Here today for her Diabetes Check.  Non-Fasting. Things are going well. Does have a few questions to discuss. )        7/29/2024     3:36 PM   Additional Questions   Roomed by Charu Pascal CMA   Accompanied by Self     History of Present Illness       Reason for visit:  Check up    She eats 0-1 servings of fruits and vegetables daily.She consumes 0 sweetened beverage(s) daily.She exercises with enough effort to increase her heart rate 10 to 19 minutes per day.  She exercises with enough effort to increase her heart rate 3 or less days per week. She is missing 3 dose(s) of medications per week.  She is not taking prescribed medications regularly due to remembering to take.         Medication Followup of Diabetes  Taking Medication as prescribed: yes  Side Effects:  None  Medication Helping Symptoms:  yes        Review of Systems  CONSTITUTIONAL: NEGATIVE for fever, chills, change in weight  INTEGUMENTARY/SKIN: NEGATIVE for worrisome rashes, moles or lesions  EYES: NEGATIVE for vision changes or irritation  ENT/MOUTH: NEGATIVE for ear, mouth and throat problems  RESP: NEGATIVE for significant cough or SOB  BREAST:  "NEGATIVE for masses, tenderness or discharge  CV: NEGATIVE for chest pain, palpitations or peripheral edema  GI: NEGATIVE for nausea, abdominal pain, heartburn, or change in bowel habits  : NEGATIVE for frequency, dysuria, or hematuria  MUSCULOSKELETAL: NEGATIVE for significant arthralgias or myalgia  NEURO: NEGATIVE for weakness, dizziness or paresthesias  ENDOCRINE: NEGATIVE for temperature intolerance, skin/hair changes  HEME: NEGATIVE for bleeding problems  PSYCHIATRIC: NEGATIVE for changes in mood or affect      Objective    /84 (BP Location: Right arm, Patient Position: Sitting, Cuff Size: Adult Large)   Pulse 73   Temp 98.6  F (37  C) (Oral)   Resp 16   Ht 1.651 m (5' 5\")   Wt 84.8 kg (187 lb)   LMP 07/16/2024 (Exact Date)   SpO2 98%   BMI 31.12 kg/m    Body mass index is 31.12 kg/m .  Physical Exam   GENERAL: alert and no distress  EYES: Eyes grossly normal to inspection, PERRL and s  NECK: no adenopathy, no asymmetry, masses, or scars  RESP: lungs clear to auscultation - no rales, rhonchi or wheezes  CV: regular rate and rhythm, normal S1 S2, no S3 or S4, no murmur, click or rub, no peripheral edema  ABDOMEN: soft, nontender, no hepatosplenomegaly, no masses and bowel sounds normal  MS: no gross musculoskeletal defects noted, no edema  SKIN: no suspicious lesions or rashes  NEURO: Normal strength and tone, mentation intact and speech normal  PSYCH: mentation appears normal, affect normal/bright            Signed Electronically by: Adriane Smyth MD    "

## 2024-09-16 ENCOUNTER — ANCILLARY PROCEDURE (OUTPATIENT)
Dept: MAMMOGRAPHY | Facility: CLINIC | Age: 44
End: 2024-09-16
Attending: FAMILY MEDICINE
Payer: COMMERCIAL

## 2024-09-16 DIAGNOSIS — Z13.9 SCREENING FOR CONDITION: ICD-10-CM

## 2024-09-16 PROCEDURE — 77063 BREAST TOMOSYNTHESIS BI: CPT | Mod: TC | Performed by: FAMILY MEDICINE

## 2024-09-16 PROCEDURE — 77067 SCR MAMMO BI INCL CAD: CPT | Mod: TC | Performed by: FAMILY MEDICINE

## 2024-11-16 ENCOUNTER — HEALTH MAINTENANCE LETTER (OUTPATIENT)
Age: 44
End: 2024-11-16

## 2025-01-30 ENCOUNTER — OFFICE VISIT (OUTPATIENT)
Dept: FAMILY MEDICINE | Facility: CLINIC | Age: 45
End: 2025-01-30
Payer: COMMERCIAL

## 2025-01-30 VITALS
DIASTOLIC BLOOD PRESSURE: 82 MMHG | HEART RATE: 77 BPM | SYSTOLIC BLOOD PRESSURE: 131 MMHG | RESPIRATION RATE: 16 BRPM | WEIGHT: 191 LBS | OXYGEN SATURATION: 97 % | HEIGHT: 66 IN | TEMPERATURE: 98.3 F | BODY MASS INDEX: 30.7 KG/M2

## 2025-01-30 DIAGNOSIS — M54.50 CHRONIC MIDLINE LOW BACK PAIN WITHOUT SCIATICA: ICD-10-CM

## 2025-01-30 DIAGNOSIS — G89.29 CHRONIC MIDLINE LOW BACK PAIN WITHOUT SCIATICA: ICD-10-CM

## 2025-01-30 DIAGNOSIS — E11.9 TYPE 2 DIABETES MELLITUS WITHOUT COMPLICATION, WITHOUT LONG-TERM CURRENT USE OF INSULIN (H): ICD-10-CM

## 2025-01-30 DIAGNOSIS — Z00.00 WELL ADULT EXAM: Primary | ICD-10-CM

## 2025-01-30 DIAGNOSIS — E04.9 ENLARGED THYROID: ICD-10-CM

## 2025-01-30 DIAGNOSIS — Z13.29 SCREENING FOR THYROID DISORDER: ICD-10-CM

## 2025-01-30 LAB
ANION GAP SERPL CALCULATED.3IONS-SCNC: 8 MMOL/L (ref 7–15)
BUN SERPL-MCNC: 12.3 MG/DL (ref 6–20)
CALCIUM SERPL-MCNC: 9.2 MG/DL (ref 8.8–10.4)
CHLORIDE SERPL-SCNC: 104 MMOL/L (ref 98–107)
CHOLEST SERPL-MCNC: 166 MG/DL
CREAT SERPL-MCNC: 0.58 MG/DL (ref 0.51–0.95)
CREAT UR-MCNC: 103 MG/DL
EGFRCR SERPLBLD CKD-EPI 2021: >90 ML/MIN/1.73M2
EST. AVERAGE GLUCOSE BLD GHB EST-MCNC: 160 MG/DL
FASTING STATUS PATIENT QL REPORTED: NO
FASTING STATUS PATIENT QL REPORTED: NO
GLUCOSE SERPL-MCNC: 165 MG/DL (ref 70–99)
HBA1C MFR BLD: 7.2 % (ref 0–5.6)
HCO3 SERPL-SCNC: 26 MMOL/L (ref 22–29)
HDLC SERPL-MCNC: 41 MG/DL
LDLC SERPL CALC-MCNC: 107 MG/DL
MICROALBUMIN UR-MCNC: <12 MG/L
MICROALBUMIN/CREAT UR: NORMAL MG/G{CREAT}
NONHDLC SERPL-MCNC: 125 MG/DL
POTASSIUM SERPL-SCNC: 4.3 MMOL/L (ref 3.4–5.3)
SODIUM SERPL-SCNC: 138 MMOL/L (ref 135–145)
TRIGL SERPL-MCNC: 92 MG/DL
TSH SERPL DL<=0.005 MIU/L-ACNC: 1.68 UIU/ML (ref 0.3–4.2)

## 2025-01-30 RX ORDER — CYCLOBENZAPRINE HCL 10 MG
10 TABLET ORAL
Qty: 30 TABLET | Refills: 1 | Status: SHIPPED | OUTPATIENT
Start: 2025-01-30

## 2025-01-30 RX ORDER — CYCLOBENZAPRINE HCL 10 MG
10 TABLET ORAL
COMMUNITY
Start: 2024-11-18 | End: 2025-01-30

## 2025-01-30 RX ORDER — METFORMIN HYDROCHLORIDE 500 MG/1
TABLET, EXTENDED RELEASE ORAL
Qty: 270 TABLET | Refills: 1 | Status: SHIPPED | OUTPATIENT
Start: 2025-01-30

## 2025-01-30 SDOH — HEALTH STABILITY: PHYSICAL HEALTH: ON AVERAGE, HOW MANY DAYS PER WEEK DO YOU ENGAGE IN MODERATE TO STRENUOUS EXERCISE (LIKE A BRISK WALK)?: 1 DAY

## 2025-01-30 SDOH — HEALTH STABILITY: PHYSICAL HEALTH: ON AVERAGE, HOW MANY MINUTES DO YOU ENGAGE IN EXERCISE AT THIS LEVEL?: 20 MIN

## 2025-01-30 ASSESSMENT — SOCIAL DETERMINANTS OF HEALTH (SDOH): HOW OFTEN DO YOU GET TOGETHER WITH FRIENDS OR RELATIVES?: TWICE A WEEK

## 2025-01-30 NOTE — PROGRESS NOTES
"Preventive Care Visit  [facility]  Adriane Smyth MD, Family Medicine  Jan 30, 2025      Assessment & Plan     (Z00.00) Well adult exam  (primary encounter diagnosis)  Comment: Discussed healthy eating   Discussed maintaining ideal weight   Discussed regular exercise   Discussed maintaining ideal weight       (E11.9) Type 2 diabetes mellitus without complication, without long-term current use of insulin (H)  Comment:   Lab Results   Component Value Date    A1C 7.2 01/30/2025    A1C 6.7 07/29/2024    A1C 7.3 02/23/2024    A1C 6.8 11/17/2023        Plan: HEMOGLOBIN A1C, BASIC METABOLIC PANEL, Lipid         panel reflex to direct LDL Non-fasting, Albumin        Random Urine Quantitative with Creat Ratio,         metFORMIN (GLUCOPHAGE XR) 500 MG 24 hr tablet        Recommend to increase metformin to 3 tabs a day .   Will continue to monitor .  Will hold statin planning for pregnancy     (M54.50,  G89.29) Chronic midline low back pain without sciatica  Comment: discussed lumbar back stretching exercises   Plan: cyclobenzaprine (FLEXERIL) 10 MG tablet            (Z13.29) Screening for thyroid disorder  Comment:   Plan: TSH with free T4 reflex            (E04.9) Enlarged thyroid  Comment:   Plan: US Thyroid        Will proceed with the thyroid  ultrasound scan ? Nodule right thyroid lobe .        BMI  Estimated body mass index is 30.83 kg/m  as calculated from the following:    Height as of this encounter: 1.676 m (5' 6\").    Weight as of this encounter: 86.6 kg (191 lb).   Weight management plan: Discussed healthy diet and exercise guidelines    Counseling  Appropriate preventive services were addressed with this patient via screening, questionnaire, or discussion as appropriate for fall prevention, nutrition, physical activity, Tobacco-use cessation, social engagement, weight loss and cognition.  Checklist reviewing preventive services available has been given to the patient.  Reviewed patient's " diet, addressing concerns and/or questions.   She is at risk for lack of exercise and has been provided with information to increase physical activity for the benefit of her well-being.       Subjective   Jo Ann is a 44 year old, presenting for the following:  Physical (PE---pap UTD)        1/30/2025    10:15 AM   Additional Questions   Roomed by Consuelo Talamantes          HPI    Discussed healthy eating   Discussed maintaining ideal weight   Discussed regular exercise .  Health Care Directive  Patient does not have a Health Care Directive:       1/30/2025   General Health   How would you rate your overall physical health? Excellent   Feel stress (tense, anxious, or unable to sleep) Not at all         1/30/2025   Nutrition   Three or more servings of calcium each day? Yes   Diet: Diabetic   How many servings of fruit and vegetables per day? (!) 2-3   How many sweetened beverages each day? 0-1         1/30/2025   Exercise   Days per week of moderate/strenous exercise 1 day   Average minutes spent exercising at this level 20 min   (!) EXERCISE CONCERN      1/30/2025   Social Factors   Frequency of gathering with friends or relatives Twice a week   Worry food won't last until get money to buy more No   Food not last or not have enough money for food? No   Do you have housing? (Housing is defined as stable permanent housing and does not include staying ouside in a car, in a tent, in an abandoned building, in an overnight shelter, or couch-surfing.) No   Are you worried about losing your housing? No   Lack of transportation? No   Unable to get utilities (heat,electricity)? No   Want help with housing or utility concern? No   (!) HOUSING CONCERN PRESENT      1/30/2025   Dental   Dentist two times every year? Yes         1/30/2025   TB Screening   Were you born outside of the [place]? Yes         Today's PHQ-2 Score:       1/30/2025    10:04 AM   PHQ-2 ( 1999 [employer])   Q1: Little interest or pleasure in doing things 0   Q2: Feeling  down, depressed or hopeless 0   PHQ-2 Score 0    Q1: Little interest or pleasure in doing things Not at all   Q2: Feeling down, depressed or hopeless Not at all   PHQ-2 Score 0       Patient-reported           1/30/2025   Substance Use   Alcohol more than 3/day or more than 7/wk No   Do you use any other substances recreationally? No     Social History     Tobacco Use    Smoking status: Never     Passive exposure: Never    Smokeless tobacco: Never   Vaping Use    Vaping status: Never Used   Substance Use Topics    Alcohol use: Not Currently    Drug use: Never           9/16/2024   LAST FHS-7 RESULTS   1st degree relative breast or ovarian cancer No   Any relative bilateral breast cancer No   Any male have breast cancer No   Any ONE woman have BOTH breast AND ovarian cancer No   Any woman with breast cancer before 50yrs No   2 or more relatives with breast AND/OR ovarian cancer No   2 or more relatives with breast AND/OR bowel cancer No        Mammogram Screening - Mammogram every 1-2 years updated in Health Maintenance based on mutual decision making        1/30/2025   STI Screening   New sexual partner(s) since last STI/HIV test? No     History of abnormal Pap smear: No - age 30-64 HPV with reflex Pap every 5 years recommended        Latest Ref Rng & Units 2/23/2024    10:40 AM 5/6/2016    12:00 AM   PAP / HPV   PAP  Negative for Intraepithelial Lesion or Malignancy (NILM)     HPV 16 DNA Negative Negative     HPV 18 DNA Negative Negative     Other HR HPV Negative Negative     PAP-ABSTRACT   See Scanned Document           This result is from an external source.     ASCVD Risk   The 10-year ASCVD risk score (Conchita VASQUEZ, et al., 2019) is: 3.3%    Values used to calculate the score:      Age: 44 years      Sex: Female      Is Non- : Yes      Diabetic: Yes      Tobacco smoker: No      Systolic Blood Pressure: 131 mmHg      Is BP treated: No      HDL Cholesterol: 40 mg/dL      Total  "Cholesterol: 147 mg/dL        1/30/2025   Contraception/Family Planning   Questions about contraception or family planning (!) DECLINE        Reviewed and updated as needed this visit by Provider                    Past Medical History:   Diagnosis Date    Type 2 diabetes mellitus (H)      Past Surgical History:   Procedure Laterality Date    APPENDECTOMY           Review of Systems  CONSTITUTIONAL: NEGATIVE for fever, chills, change in weight  INTEGUMENTARY/SKIN: NEGATIVE for worrisome rashes, moles or lesions  EYES: NEGATIVE for vision changes or irritation  ENT/MOUTH: NEGATIVE for ear, mouth and throat problems  RESP: NEGATIVE for significant cough or SOB  BREAST: NEGATIVE for masses, tenderness or discharge  CV: NEGATIVE for chest pain, palpitations or peripheral edema  GI: NEGATIVE for nausea, abdominal pain, heartburn, or change in bowel habits  : NEGATIVE for frequency, dysuria, or hematuria  MUSCULOSKELETAL: NEGATIVE for significant arthralgias or myalgia  NEURO: NEGATIVE for weakness, dizziness or paresthesias  ENDOCRINE: NEGATIVE for temperature intolerance, skin/hair changes  HEME: NEGATIVE for bleeding problems  PSYCHIATRIC: NEGATIVE for changes in mood or affect     Objective    Exam  /82 (BP Location: Right arm, Patient Position: Chair, Cuff Size: Adult Large)   Pulse 77   Temp 98.3  F (36.8  C) (Oral)   Resp 16   Ht 1.676 m (5' 6\")   Wt 86.6 kg (191 lb)   LMP 01/23/2025   SpO2 97%   BMI 30.83 kg/m     Estimated body mass index is 30.83 kg/m  as calculated from the following:    Height as of this encounter: 1.676 m (5' 6\").    Weight as of this encounter: 86.6 kg (191 lb).    Physical Exam  GENERAL: alert and no distress  EYES: Eyes grossly normal to inspection, PERRL and conjunctivae and sclerae normal  HENT: ear canals and TM's normal, nose and mouth without ulcers or lesions  NECK: no adenopathy, no asymmetry, masses, or scars  RESP: lungs clear to auscultation - no rales, rhonchi or " wheezes  CV: regular rate and rhythm, normal S1 S2, no S3 or S4, no murmur, click or rub, no peripheral edema  ABDOMEN: soft, nontender, no hepatosplenomegaly, no masses and bowel sounds normal  MS: no gross musculoskeletal defects noted, no edema  SKIN: no suspicious lesions or rashes  NEURO: Normal strength and tone, mentation intact and speech normal  PSYCH: mentation appears normal, affect normal/bright    Breast and pelvic exam normal .        Signed Electronically by: Adriane Smyth MD

## 2025-02-12 ENCOUNTER — HOSPITAL ENCOUNTER (OUTPATIENT)
Dept: ULTRASOUND IMAGING | Facility: CLINIC | Age: 45
Discharge: HOME | End: 2025-02-12
Attending: FAMILY MEDICINE
Payer: COMMERCIAL

## 2025-02-12 DIAGNOSIS — E04.9 ENLARGED THYROID: ICD-10-CM

## 2025-02-12 PROCEDURE — 76536 US EXAM OF HEAD AND NECK: CPT

## 2025-05-11 ENCOUNTER — HEALTH MAINTENANCE LETTER (OUTPATIENT)
Age: 45
End: 2025-05-11

## 2025-08-23 ENCOUNTER — HEALTH MAINTENANCE LETTER (OUTPATIENT)
Age: 45
End: 2025-08-23